# Patient Record
Sex: FEMALE | Race: WHITE
[De-identification: names, ages, dates, MRNs, and addresses within clinical notes are randomized per-mention and may not be internally consistent; named-entity substitution may affect disease eponyms.]

---

## 2019-01-07 ENCOUNTER — HOSPITAL ENCOUNTER (INPATIENT)
Dept: HOSPITAL 75 - ER | Age: 59
LOS: 4 days | Discharge: HOME | DRG: 871 | End: 2019-01-11
Attending: INTERNAL MEDICINE | Admitting: INTERNAL MEDICINE
Payer: COMMERCIAL

## 2019-01-07 VITALS — HEIGHT: 64 IN | WEIGHT: 112 LBS | BODY MASS INDEX: 19.12 KG/M2

## 2019-01-07 DIAGNOSIS — Z88.0: ICD-10-CM

## 2019-01-07 DIAGNOSIS — Z86.010: ICD-10-CM

## 2019-01-07 DIAGNOSIS — A41.9: Primary | ICD-10-CM

## 2019-01-07 DIAGNOSIS — K57.90: ICD-10-CM

## 2019-01-07 DIAGNOSIS — E78.00: ICD-10-CM

## 2019-01-07 DIAGNOSIS — G43.909: ICD-10-CM

## 2019-01-07 DIAGNOSIS — J45.41: ICD-10-CM

## 2019-01-07 DIAGNOSIS — Z88.5: ICD-10-CM

## 2019-01-07 DIAGNOSIS — Z88.8: ICD-10-CM

## 2019-01-07 DIAGNOSIS — J18.1: ICD-10-CM

## 2019-01-07 DIAGNOSIS — J44.9: ICD-10-CM

## 2019-01-07 DIAGNOSIS — F32.9: ICD-10-CM

## 2019-01-07 DIAGNOSIS — Z85.038: ICD-10-CM

## 2019-01-07 DIAGNOSIS — B00.9: ICD-10-CM

## 2019-01-07 DIAGNOSIS — K52.9: ICD-10-CM

## 2019-01-07 DIAGNOSIS — M19.91: ICD-10-CM

## 2019-01-07 DIAGNOSIS — E78.2: ICD-10-CM

## 2019-01-07 LAB
ALBUMIN SERPL-MCNC: 3.8 GM/DL (ref 3.2–4.5)
ALP SERPL-CCNC: 110 U/L (ref 40–136)
ALT SERPL-CCNC: 27 U/L (ref 0–55)
BASOPHILS # BLD AUTO: 0 10^3/UL (ref 0–0.1)
BASOPHILS NFR BLD AUTO: 0 % (ref 0–10)
BASOPHILS NFR BLD MANUAL: 0 %
BILIRUB SERPL-MCNC: 1.4 MG/DL (ref 0.1–1)
BUN/CREAT SERPL: 16
CALCIUM SERPL-MCNC: 9.4 MG/DL (ref 8.5–10.1)
CHLORIDE SERPL-SCNC: 97 MMOL/L (ref 98–107)
CO2 SERPL-SCNC: 24 MMOL/L (ref 21–32)
CREAT SERPL-MCNC: 0.8 MG/DL (ref 0.6–1.3)
EOSINOPHIL # BLD AUTO: 0 10^3/UL (ref 0–0.3)
EOSINOPHIL NFR BLD AUTO: 0 % (ref 0–10)
EOSINOPHIL NFR BLD MANUAL: 0 %
ERYTHROCYTE [DISTWIDTH] IN BLOOD BY AUTOMATED COUNT: 13 % (ref 10–14.5)
GFR SERPLBLD BASED ON 1.73 SQ M-ARVRAT: > 60 ML/MIN
GLUCOSE SERPL-MCNC: 140 MG/DL (ref 70–105)
HCT VFR BLD CALC: 37 % (ref 35–52)
HGB BLD-MCNC: 12.4 G/DL (ref 11.5–16)
LYMPHOCYTES # BLD AUTO: 1.2 X 10^3 (ref 1–4)
LYMPHOCYTES NFR BLD AUTO: 5 % (ref 12–44)
MANUAL DIFFERENTIAL PERFORMED BLD QL: YES
MCH RBC QN AUTO: 29 PG (ref 25–34)
MCHC RBC AUTO-ENTMCNC: 33 G/DL (ref 32–36)
MCV RBC AUTO: 86 FL (ref 80–99)
MONOCYTES # BLD AUTO: 1.4 X 10^3 (ref 0–1)
MONOCYTES NFR BLD AUTO: 6 % (ref 0–12)
MONOCYTES NFR BLD: 4 %
NEUTROPHILS # BLD AUTO: 19.7 X 10^3 (ref 1.8–7.8)
NEUTROPHILS NFR BLD AUTO: 88 % (ref 42–75)
NEUTS BAND NFR BLD MANUAL: 85 %
NEUTS BAND NFR BLD: 7 %
PLATELET # BLD: 294 10^3/UL (ref 130–400)
PMV BLD AUTO: 11.5 FL (ref 7.4–10.4)
POTASSIUM SERPL-SCNC: 3.5 MMOL/L (ref 3.6–5)
PROT SERPL-MCNC: 6.9 GM/DL (ref 6.4–8.2)
RBC # BLD AUTO: 4.34 10^6/UL (ref 4.35–5.85)
RBC MORPH BLD: NORMAL
SODIUM SERPL-SCNC: 137 MMOL/L (ref 135–145)
VARIANT LYMPHS NFR BLD MANUAL: 4 %
WBC # BLD AUTO: 22.4 10^3/UL (ref 4.3–11)

## 2019-01-07 PROCEDURE — 80053 COMPREHEN METABOLIC PANEL: CPT

## 2019-01-07 PROCEDURE — 85007 BL SMEAR W/DIFF WBC COUNT: CPT

## 2019-01-07 PROCEDURE — 94640 AIRWAY INHALATION TREATMENT: CPT

## 2019-01-07 PROCEDURE — 87040 BLOOD CULTURE FOR BACTERIA: CPT

## 2019-01-07 PROCEDURE — 83605 ASSAY OF LACTIC ACID: CPT

## 2019-01-07 PROCEDURE — 94760 N-INVAS EAR/PLS OXIMETRY 1: CPT

## 2019-01-07 PROCEDURE — 96375 TX/PRO/DX INJ NEW DRUG ADDON: CPT

## 2019-01-07 PROCEDURE — 87804 INFLUENZA ASSAY W/OPTIC: CPT

## 2019-01-07 PROCEDURE — 85027 COMPLETE CBC AUTOMATED: CPT

## 2019-01-07 PROCEDURE — 71046 X-RAY EXAM CHEST 2 VIEWS: CPT

## 2019-01-07 PROCEDURE — 96374 THER/PROPH/DIAG INJ IV PUSH: CPT

## 2019-01-07 PROCEDURE — 36415 COLL VENOUS BLD VENIPUNCTURE: CPT

## 2019-01-07 PROCEDURE — 85025 COMPLETE CBC W/AUTO DIFF WBC: CPT

## 2019-01-07 PROCEDURE — 94664 DEMO&/EVAL PT USE INHALER: CPT

## 2019-01-07 PROCEDURE — 93041 RHYTHM ECG TRACING: CPT

## 2019-01-07 NOTE — DIAGNOSTIC IMAGING REPORT
PATIENT HISTORY: Cough and fever. 



TECHNIQUE: 2 views of the chest



COMPARISON: CT from 06/30/2015



FINDINGS:

There are dense airspace consolidations in the right lung. Lung

volumes are large. The cardiac silhouette is normal in size.

There is no pleural effusion or pneumothorax. No acute osseous

abnormality is seen.



IMPRESSION: 

1. Dense consolidations in the right upper lobe, consistent with

pneumonia. Recommend followup radiographs to resolution, or

followup chest CT as underlying neoplasm is not excluded.



Dictated by: 



  Dictated on workstation # [id number]

## 2019-01-07 NOTE — XMS REPORT
Continuity of Care Document

 Created on: 2019



ISAI BERMUDEZ

External Reference #: [id number]

: 1960

Sex: Female



Demographics







 Address  [address] [place]  [address]

 

 Home Phone  [phone] x

 

 Preferred Language  Unknown

 

 Marital Status  Unknown

 

 Denominational Affiliation  Unknown

 

 Race  Unknown

 

 Ethnic Group  Unknown





Author







 Author  [facility]

 

 Organization  [facility]

 

 Address  Unknown

 

 Phone  Unavailable



              



Allergies

      





 Active            Description            Code            Type            
Severity            Reaction            Onset            Reported/Identified   
         Relationship to Patient            Clinical Status        

 

 Yes            AUGMENTIN                                      UNKNOWN         
   OTHER                                                           

 

 Yes            CODEINE SULFATE                                      UNKNOWN   
         OTHER                                                           

 

 Yes            LEVAQUIN                                      UNKNOWN          
  ITCHING                                                           

 

 Yes            SINGULAIR                                      UNKNOWN         
   ITCHING                                                           

 

 Yes            TOPAMAX                                      SEVERE            
OTHER                                                           

 

 Yes            No Allergy Information Available            [id number]         
   Drug Allergy            Unknown            N/A                         2015                                  

 

 Yes            acetaminophen            [id number]            Drug Allergy    
        Moderate            "KNOCKED HER OU                         2016 
                                 

 

 Yes            amoxicillin            [id number]            Drug Allergy      
      Moderate            LIPS SWELL                         2016        
                          

 

 Yes            clavulanic acid            [id number]            Drug Allergy  
          Moderate            LIPS SWELL                         2016    
                              

 

 Yes            codeine            [id number]            Drug Allergy          
  Moderate            "KNOCKED HER OU                         2016       
                           



                                    



Medications

      





 Medication            Packaging            Start Date            Stop Date    
        Route            Dosage            Sig        

 

             NORMAL SALINE 500CC IV BAG INJ 0.9 % (NS 500CC IV BAG)            
          ml            2018                       
                           ONCE&1605                  

 

             ALPRAZOLAM TAB 0.25 MG (XANAX)                      MG            
10/31/2018            10/31/2018                                               
   PRN ONCE                  



                    



Problems

      





 Date Dx Coded            Attending            Type            Code            
Diagnosis            Diagnosed By        

 

 2015                         Ot            V76.12                       
           

 

 2015            RADHA CRAWLEY, MONICA GROSS            Ot            V76.12    
                              

 

 2015            GABBY BAIG DO            Ot            153.9 
                                 

 

 2015            GABBY BAIG DO            Ot            153.9 
                                 

 

 2015            NATALIA CRAWLEY, PIPPA QUINTEROS            Ot            153.9          
                        

 

 2015            NATALIA CRAWLEY, PIPPA QUINTEROS            Ot            153.9          
                        

 

 2015            NATALIA CRAWLEY, PIPPA QUINTEROS            Ot            153.9          
  MALIGNANT JACOB COLON NOS                     

 

 10/02/2015                         Ot            V76.12                       
           

 

 2015                         Ot            V76.12                       
           

 

 2016            RADHA CRAWLEY, MONICA GROSS            Ot            Z12.31    
                              

 

 10/17/2016                         Ot            V76.12            OTH SCREEN 
MAMMO-MALIGN NEOPLASM OF FELIPE                     

 

 10/17/2016            RADHA CRAWLEY, MONICA GROSS            Ot            V76.12    
        OTH SCREEN MAMMO-MALIGN NEOPLASM OF FELIPE                     

 

 10/17/2016            JOVANNI DO, LASHAEROUTIE            Ot            153.9 
           MALIGNANT JACOB COLON NOS                     

 

 10/17/2016            JOVANNI DO, CHANDROUTIE            Ot            153.9 
           MALIGNANT JACOB COLON NOS                     

 

 10/17/2016            RADHA CRAWLEY, MONICA GROSS            Ot            Z12.31    
        ENCNTR SCREEN MAMMOGRAM FOR MALIGNANT NE                     

 

 10/19/2016                         Ot            V76.12            OTH SCREEN 
MAMMO-MALIGN NEOPLASM OF FELIPE                     

 

 10/19/2016            MONICA GARCIA MD            Ot            V76.12    
        OTH SCREEN MAMMO-MALIGN NEOPLASM OF FELIPE                     

 

 10/19/2016            JOVANNI JOHNSTON, LASHAEROUTIE            Ot            153.9 
           MALIGNANT JACOB COLON NOS                     

 

 10/19/2016            JOVANNI JOHNSTON, LASHAEROUTIE            Ot            153.9 
           MALIGNANT JACOB COLON NOS                     

 

 10/19/2016            RADHA CRAWLEY, MONICA GROSS            Ot            Z12.31    
        ENCNTR SCREEN MAMMOGRAM FOR MALIGNANT NE                     

 

 2016            ROSALES COOK MD            Ot            Z01.818       
     ENCOUNTER FOR OTHER PREPROCEDURAL EXAMIN                     

 

 2016            ROSALES COOK MD            Ot            Z12.11        
    ENCOUNTER FOR SCREENING FOR MALIGNANT NE                     

 

 2016            ROSALES COOK MD            Ot            Z85.048       
     PRSNL HX OF MALIG NEOPLM OF RECTUM, RECT                     

 

 2016            ROSALES COOK MD            Ot            K57.30        
    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                     

 

 2016            ROSALES COOK MD            Ot            K63.5         
   POLYP OF COLON                     

 

 2016            ROSALES COOK MD            Ot            K64.1         
   SECOND DEGREE HEMORRHOIDS                     

 

 2016            ROSALES COOK MD            Ot            Z12.11        
    ENCOUNTER FOR SCREENING FOR MALIGNANT NE                     

 

 2016            ROSALES COOK MD            Ot            Z85.048       
     PRSNL HX OF MALIG NEOPLM OF RECTUM, RECT                     

 

 2016            ROSALES COOK MD            Ot            K57.30        
    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                     

 

 2016            ROSALES COOK MD            Ot            K63.5         
   POLYP OF COLON                     

 

 2016            ROSALES COOK MD            Ot            K64.1         
   SECOND DEGREE HEMORRHOIDS                     

 

 2016            ROSALES COOK MD, Ot            Z12.11        
    ENCOUNTER FOR SCREENING FOR MALIGNANT NE                     

 

 2016            ROSALES COOK MD, Ot            Z85.048       
     PRSNL HX OF MALIG NEOPLM OF RECTUM, RECT                     

 

 2016            ROSALES COOK MD, Ot            K57.30        
    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                     

 

 2016            ROSALES COOK MD, Ot            K63.5         
   POLYP OF COLON                     

 

 2016            ROSALES COOK MD, Ot            K64.1         
   SECOND DEGREE HEMORRHOIDS                     

 

 2016            ROSALES COOK MD, Ot            Z12.11        
    ENCOUNTER FOR SCREENING FOR MALIGNANT NE                     

 

 2016            ROSALES COOK MD, Ot            Z85.048       
     PRSNL HX OF MALIG NEOPLM OF RECTUM, RECT                     

 

 2018            Erendira Stevens            787.0            
NAUSEA AND VOMITING                     

 

 2018            Erendira Stevens            R11.2            
NAUSEA WITH VOMITING, UNSPECIFIED                     

 

 2018            Erendira Stevens            787.0            
NAUSEA AND VOMITING                     

 

 2018            Erendira Stevens            R11.2            
NAUSEA WITH VOMITING, UNSPECIFIED                     

 

 2018            Brokob, Juliet            A            008.8             
                     

 

 2018            Brokob, Juliet            W            041.81            
MYCOPLASMA INFECTION IN CONDITIONS CLASSIFIED ELSEWHERE AND OF UNSPECIFIED SITE
                     

 

 2018            Brokob, Juliet            W            300.00            
ANXIETY STATE, UNSPECIFIED                     

 

 2018            Brokob, Juliet            W            780.60            
FEVER, UNSPECIFIED                     

 

 2018            Brokob, Juliet            W            791.9            
OTHER NONSPECIFIC FINDINGS ON EXAMINATION OF URINE                     

 

 2018            Brokob, Juliet            A            A08.4            
VIRAL INTESTINAL INFECTION, UNSPECIFIED                     

 

 2018            Brokob, Juliet            W            B96.0            
MYCOPLASMA PNEUMONIAE AS THE CAUSE OF DISEASES CLASSD ELSWHR                   
  

 

 2018            Brokob, Juliet            W            F41.9            
ANXIETY DISORDER, UNSPECIFIED                     

 

 2018            Brokob, Juliet            W            R50.9            
FEVER, UNSPECIFIED                     

 

 2018            Brokob, Juliet            W            R82.99            
OTHER ABNORMAL FINDINGS IN URINE                     

 

 2018            Kemar Villavicencio            300.00            
ANXIETY STATE, UNSPECIFIED                     

 

 2018            Kemar Villavicencio            486            
PNEUMONIA, ORGANISM UNSPECIFIED                     

 

 2018            Kemar Villavicencio            780.79            
OTHER MALAISE AND FATIGUE                     

 

 2018            Kemar Villavicencio            F41.9            
ANXIETY DISORDER, UNSPECIFIED                     

 

 2018            Kemar Villavicencio            J18.9            
PNEUMONIA, UNSPECIFIED ORGANISM                     

 

 2018            Kemar Villavicencio            R53.83            
OTHER FATIGUE                     

 

 2018            MONICA GARCIA            W            272.4            
OTHER AND UNSPECIFIED HYPERLIPIDEMIA                     

 

 2018            MONICA GARCIA            W            E78.5            
HYPERLIPIDEMIA, UNSPECIFIED                     

 

 2018            GARCIAMONICA REYES            W            V70.0            
ROUTINE GENERAL MEDICAL EXAMINATION AT A HEALTH CARE FACILITY                  
   

 

 2018            MONICA GARCIA            W            Z00.00          
  ENCOUNTER FOR GENERAL ADULT MEDICAL EXAMINATION WITHOUT ABNORMAL FINDINGS    
                 

 

 2018            MONICA GARCIA            W            272.4            
OTHER AND UNSPECIFIED HYPERLIPIDEMIA                     

 

 2018            MONICA GARCIA            W            E78.5            
HYPERLIPIDEMIA, UNSPECIFIED                     

 

 2018            GARCIAMONICA REYES            W            V70.0            
ROUTINE GENERAL MEDICAL EXAMINATION AT A HEALTH CARE FACILITY                  
   

 

 2018            MONICA GARCIA            W            Z00.00          
  ENCOUNTER FOR GENERAL ADULT MEDICAL EXAMINATION WITHOUT ABNORMAL FINDINGS    
                 

 

 2018            MONICA GARCIA            W            272.4            
OTHER AND UNSPECIFIED HYPERLIPIDEMIA                     

 

 2018            MONICA GARCIA            W            E78.5            
HYPERLIPIDEMIA, UNSPECIFIED                     

 

 2018            GARCIAMONICA REYES            W            V70.0            
ROUTINE GENERAL MEDICAL EXAMINATION AT A HEALTH CARE FACILITY                  
   

 

 2018            GARCIAMONICA REYES            W            Z00.00          
  ENCOUNTER FOR GENERAL ADULT MEDICAL EXAMINATION WITHOUT ABNORMAL FINDINGS    
                 

 

 2018            MONICA GARCIA            W            V76.10          
  BREAST SCREENING, UNSPECIFIED                     

 

 2018            MONICA GARCIA            W            Z12.39          
  ENCOUNTER FOR OTHER SCREENING FOR MALIGNANT NEOPLASM OF BREAST               
      

 

 2018            MONICA GARCIA            W            V76.10          
  BREAST SCREENING, UNSPECIFIED                     

 

 2018            MONICA GARCIA            W            Z12.39          
  ENCOUNTER FOR OTHER SCREENING FOR MALIGNANT NEOPLASM OF BREAST               
      

 

 10/31/2018            Kareem Kwan            300.00            
ANXIETY STATE, UNSPECIFIED                     

 

 10/31/2018            Kareem Kwan            491.20            
OBSTRUCTIVE CHRONIC BRONCHITIS, WITHOUT  EXACERBATION                     

 

 10/31/2018            Kareem Kwan            786.09            
OTHER DYSPNEA AND RESPIRATORY ABNORMALITY                     

 

 10/31/2018            Kareem Kwan            F41.9            
ANXIETY DISORDER, UNSPECIFIED                                                  
  

 

 10/31/2018            Kareem Kwan            J44.9            
CHRONIC OBSTRUCTIVE PULMONARY DISEASE, UNSPECIFIED                             
  

 

 10/31/2018            Kareem Kwan            R06.09            
OTHER FORMS OF DYSPNEA                     



                                                                               
                                                                               
                  



Procedures

      



There is no data.                  



Results

      





 Test            Result            Range        









 Urine Culture - 17 15:00         









 PRELIM CULTURE RESULTS            No Growth 24 hours                      

 

 FINAL CULTURE RESULTS            <10,000 Gram Positive Mixed Rashmi 
[id number] Skin Contaminant [id number] Further Workup done                      

 

 MEDIA PLATED            Setup at 15:16 on 3/24/2017                      

 

 CULTURE SOURCE            VOID                      









 Mycoplasma - 18 15:02         









 Mycoplasma            Negative             Negative        









 Thyroid Stimulating Hormone - 18 14:53         









 TSH            0.81 mIU/mL            0.32-5.00        









 Urinalysis - 18 14:53         









 Icotest            Negative             Negative        

 

 Urine Volume            Urine Volume Sufficient (10mL)                      

 

 Urine Yeast            No Yeast present                      

 

 Urine-Appearance            Clear             Clear        

 

 Urine-Bacteria            1+                      

 

 Urine-Bilirubin            1+             Negative        

 

 Urine-Blood            Trace-intact             Negative        

 

 Urine-Color            Yellow             Colorless-Lt. Yellow        

 

 Urine-Epithelial Cells            0-5/HPF                      

 

 Urine-Glucose            Negative             Negative        

 

 Urine-Ketones            1+             Negative        

 

 Urine-Leukocytes            Trace             Negative        

 

 Urine-Nitrite            Negative             Negative        

 

 Urine-Other            Culture to follow                      

 

 Urine-pH            7.0             5-8.5        

 

 Urine-Protein            Trace             Negative        

 

 Urine-RBC            0-2/HPF                      

 

 Urine-Specific Gravity            1.020             1.000-1.030        

 

 Urine-WBC            5-10/HPF                      

 

 Urobilinogen            0.2 E.U./dL             0.2-1.0        









 Urine Culture - 18 14:53         









 PRELIM CULTURE RESULTS            10,000-20,000 Gram Positive Mixed Rahsmi     
                 

 

 FINAL CULTURE RESULTS            10,000-20,000 Gram Positive Mixed Rashmi 
Probable Skin Contaminant No Further Workup done                      

 

 MEDIA PLATED            Setup at 17:45 on 2018                      

 

 CULTURE SOURCE            void                      









 D-Dimer  - 18 12:07         









 DDimer            101.00 ng/mL            21..00        









 Arterial Blood Gas - 18 14:14         









 Base            2.00 mmol/L            1.80-4.20        

 

 HCO3            24 mmol/L            20-31        

 

 O2 Sat            98 RM AIR after excercise %                    

 

 pCO2            27 mm/Hg            35-45        

 

 pH            7.57             7.35-7.45        

 

 PO2            86 mm/Hg            80-95        









 Lipid Panel - 18 13:50         









 C/HDL            2.7             3.7-6.7        

 

 Cholesterol            169 mg/dL            100-240        

 

 HDL            63 mg/dL            30-85        

 

 LDL-Calculated            72 mg/dL            0-100        

 

 Trig            169 mg/dL                    

 

 VLDL            34 mg/dL            0-42        



                              



Encounters

      





 ACCT No.            Visit Date/Time            Discharge            Status    
        Pt. Type            Provider            Facility            Loc./Unit  
          Complaint        

 

 [id number]            2016 10:54:00            2016 14:15:00    
        DIS            Outpatient            ROSALES COOK MD            [facility]            SDC            HISTORY RECTAL CANCER   
     

 

 [id number]            2016 05:39:00            2016 15:09:00    
        DIS            Outpatient            ROSALES COOK MD            [facility]            PREOP            HISTORY RECTAL CANCER 
       

 

 [id number]            2015 10:46:00            2015 23:59:59    
        CLS            Outpatient            MONICA GARCIA MD            
[facility]            RAD            SCREENING        

 

 [id number]            2015 14:12:00            2015 23:59:59    
        CLS            Preadmit            PIPPA FISHER MD            [facility]            ONC                     

 

 [id number]            2015 09:19:00            2015 00:01:00    
        DIS            Outpatient            PIPPA FISHER MD            [facility]            ONC                     

 

 [id number]            2015 08:13:00            2015 23:59:59    
        CLS            Outpatient            GABBY BAIG DO          
  [facility]            RAD            [facility] CANCER      
  

 

 [id number]            2015 08:00:00            2015 23:59:59    
        CLS            Outpatient            GABBY BAIG DO          
  [facility]            RAD            [facility]        

 

 [id number]            2013 14:54:00            2013 23:59:59    
        CLS            Outpatient            MONICA GARCIA MD            
[facility]            RAD            SCREENING        

 

 [id number]            2011 08:29:00                                   
   Document Registration                                                       
     

 

 [id number]            2010 13:22:00                                   
   Document Registration                                                       
     

 

 [id number]            10/31/2018 09:25:00            10/31/2018 10:31:00          
  DIS            Outpatient            Kareem Kwan                            
                   

 

 [id number]            2018 14:41:00            2018 23:59:00          
  DIS            Outpatient            MONICA GARCIA                         
                      

 

 [id number]            2018 13:50:00            2018 23:59:00          
  DIS            Outpatient            MONICA GARCIA                         
                      

 

 [id number]            2018 11:31:00            2018 15:00:00          
  DIS            Outpatient            [facility]                     

 

 [id number]            2018 14:11:00            2018 16:33:00          
  DIS            Outpatient            Juliet Alatorre                           
                    

 

 [id number]            2018 18:05:00            2018 23:59:00          
  DIS            Outpatient            Erendira Stevens                          
                     

 

 [id number]            2017 15:00:00            2017 23:59:00          
  DIS            Outpatient            Erendira Stevens                          
                     

 

 [id number]            2018 16:07:58                                      
Document Registration

## 2019-01-07 NOTE — XMS REPORT
Continuity of Care Document

 Created on: 2019



ISAI BERMUDEZ

External Reference #: [id number]

: 1960

Sex: Female



Demographics







 Address  [address] [place]  [address]

 

 Home Phone  [phone] x

 

 Preferred Language  Unknown

 

 Marital Status  Unknown

 

 Sikhism Affiliation  Unknown

 

 Race  Unknown

 

 Ethnic Group  Unknown





Author







 Author  [facility]

 

 Organization  [facility]

 

 Address  Unknown

 

 Phone  Unavailable



              



Allergies

      





 Active            Description            Code            Type            
Severity            Reaction            Onset            Reported/Identified   
         Relationship to Patient            Clinical Status        

 

 Yes            AUGMENTIN                                      UNKNOWN         
   OTHER                                                           

 

 Yes            CODEINE SULFATE                                      UNKNOWN   
         OTHER                                                           

 

 Yes            LEVAQUIN                                      UNKNOWN          
  ITCHING                                                           

 

 Yes            SINGULAIR                                      UNKNOWN         
   ITCHING                                                           

 

 Yes            TOPAMAX                                      SEVERE            
OTHER                                                           

 

 Yes            No Allergy Information Available            [id number]         
   Drug Allergy            Unknown            N/A                         2015                                  

 

 Yes            acetaminophen            [id number]            Drug Allergy    
        Moderate            "KNOCKED HER OU                         2016 
                                 

 

 Yes            amoxicillin            [id number]            Drug Allergy      
      Moderate            LIPS SWELL                         2016        
                          

 

 Yes            clavulanic acid            [id number]            Drug Allergy  
          Moderate            LIPS SWELL                         2016    
                              

 

 Yes            codeine            [id number]            Drug Allergy          
  Moderate            "KNOCKED HER OU                         2016       
                           



                                    



Medications

      





 Medication            Packaging            Start Date            Stop Date    
        Route            Dosage            Sig        

 

             NORMAL SALINE 500CC IV BAG INJ 0.9 % (NS 500CC IV BAG)            
          ml            2018                       
                           ONCE&1605                  

 

             ALPRAZOLAM TAB 0.25 MG (XANAX)                      MG            
10/31/2018            10/31/2018                                               
   PRN ONCE                  



                    



Problems

      





 Date Dx Coded            Attending            Type            Code            
Diagnosis            Diagnosed By        

 

 2015                         Ot            V76.12                       
           

 

 2015            RADHA CRAWLEY, MONICA GROSS            Ot            V76.12    
                              

 

 2015            GABBY BAIG DO            Ot            153.9 
                                 

 

 2015            GABBY BAIG DO            Ot            153.9 
                                 

 

 2015            NATALIA CRAWLEY, PIPPA QUINTEROS            Ot            153.9          
                        

 

 2015            NATALIA CRAWLEY, PIPPA QUINTEROS            Ot            153.9          
                        

 

 2015            NATALIA CRAWLEY, PIPPA QUINTEROS            Ot            153.9          
  MALIGNANT JACOB COLON NOS                     

 

 10/02/2015                         Ot            V76.12                       
           

 

 2015                         Ot            V76.12                       
           

 

 2016            RADHA CRAWLEY, MONICA GROSS            Ot            Z12.31    
                              

 

 10/17/2016                         Ot            V76.12            OTH SCREEN 
MAMMO-MALIGN NEOPLASM OF FELIPE                     

 

 10/17/2016            ARDHA CRAWLEY, MONICA GROSS            Ot            V76.12    
        OTH SCREEN MAMMO-MALIGN NEOPLASM OF FELIPE                     

 

 10/17/2016            JOVANNI DO, LASHAEROUTIE            Ot            153.9 
           MALIGNANT JACOB COLON NOS                     

 

 10/17/2016            JOVANNI DO, CHANDROUTIE            Ot            153.9 
           MALIGNANT JACOB COLON NOS                     

 

 10/17/2016            RADHA CRAWELY, MONICA GROSS            Ot            Z12.31    
        ENCNTR SCREEN MAMMOGRAM FOR MALIGNANT NE                     

 

 10/19/2016                         Ot            V76.12            OTH SCREEN 
MAMMO-MALIGN NEOPLASM OF FELIPE                     

 

 10/19/2016            MONICA GARICA MD            Ot            V76.12    
        OTH SCREEN MAMMO-MALIGN NEOPLASM OF FELIPE                     

 

 10/19/2016            JOVANNI JOHNSTON, LASHAEROUTIE            Ot            153.9 
           MALIGNANT JACOB COLON NOS                     

 

 10/19/2016            JOVANNI JOHNSTON, LASHAEROUTIE            Ot            153.9 
           MALIGNANT JACOB COLON NOS                     

 

 10/19/2016            RADHA CRAWLEY, MONICA GROSS            Ot            Z12.31    
        ENCNTR SCREEN MAMMOGRAM FOR MALIGNANT NE                     

 

 2016            ORSALES COOK MD            Ot            Z01.818       
     ENCOUNTER FOR OTHER PREPROCEDURAL EXAMIN                     

 

 2016            ROSALES COOK MD            Ot            Z12.11        
    ENCOUNTER FOR SCREENING FOR MALIGNANT NE                     

 

 2016            ROSALES COOK MD            Ot            Z85.048       
     PRSNL HX OF MALIG NEOPLM OF RECTUM, RECT                     

 

 2016            ROSALES COOK MD            Ot            K57.30        
    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                     

 

 2016            ROSALES COOK MD            Ot            K63.5         
   POLYP OF COLON                     

 

 2016            ROSALES COOK MD            Ot            K64.1         
   SECOND DEGREE HEMORRHOIDS                     

 

 2016            ROSALES COOK MD            Ot            Z12.11        
    ENCOUNTER FOR SCREENING FOR MALIGNANT NE                     

 

 2016            ROSALES COOK MD            Ot            Z85.048       
     PRSNL HX OF MALIG NEOPLM OF RECTUM, RECT                     

 

 2016            ROSALES COOK MD            Ot            K57.30        
    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                     

 

 2016            ROSALES COOK MD            Ot            K63.5         
   POLYP OF COLON                     

 

 2016            ROSALES COOK MD            Ot            K64.1         
   SECOND DEGREE HEMORRHOIDS                     

 

 2016            ROSALES COOK MD, Ot            Z12.11        
    ENCOUNTER FOR SCREENING FOR MALIGNANT NE                     

 

 2016            ROSALES COOK MD, Ot            Z85.048       
     PRSNL HX OF MALIG NEOPLM OF RECTUM, RECT                     

 

 2016            ROSALES COOK MD, Ot            K57.30        
    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                     

 

 2016            ROSALES COOK MD, Ot            K63.5         
   POLYP OF COLON                     

 

 2016            ROSALES COOK MD, Ot            K64.1         
   SECOND DEGREE HEMORRHOIDS                     

 

 2016            ROSALES COOK MD, Ot            Z12.11        
    ENCOUNTER FOR SCREENING FOR MALIGNANT NE                     

 

 2016            ROSALES COOK MD, Ot            Z85.048       
     PRSNL HX OF MALIG NEOPLM OF RECTUM, RECT                     

 

 2018            Erendira Stevens            787.0            
NAUSEA AND VOMITING                     

 

 2018            Erendira Stevens            R11.2            
NAUSEA WITH VOMITING, UNSPECIFIED                     

 

 2018            Erendira Stevens            787.0            
NAUSEA AND VOMITING                     

 

 2018            Erendira Stevens            R11.2            
NAUSEA WITH VOMITING, UNSPECIFIED                     

 

 2018            Brokob, Juliet            A            008.8             
                     

 

 2018            Brokob, Juliet            W            041.81            
MYCOPLASMA INFECTION IN CONDITIONS CLASSIFIED ELSEWHERE AND OF UNSPECIFIED SITE
                     

 

 2018            Brokob, Juliet            W            300.00            
ANXIETY STATE, UNSPECIFIED                     

 

 2018            Brokob, Juliet            W            780.60            
FEVER, UNSPECIFIED                     

 

 2018            Brokob, Juliet            W            791.9            
OTHER NONSPECIFIC FINDINGS ON EXAMINATION OF URINE                     

 

 2018            Brokob, Juliet            A            A08.4            
VIRAL INTESTINAL INFECTION, UNSPECIFIED                     

 

 2018            Brokob, Juliet            W            B96.0            
MYCOPLASMA PNEUMONIAE AS THE CAUSE OF DISEASES CLASSD ELSWHR                   
  

 

 2018            Brokob, Juliet            W            F41.9            
ANXIETY DISORDER, UNSPECIFIED                     

 

 2018            Brokob, Juliet            W            R50.9            
FEVER, UNSPECIFIED                     

 

 2018            Brokob, Juliet            W            R82.99            
OTHER ABNORMAL FINDINGS IN URINE                     

 

 2018            Kemar Villavicencio            300.00            
ANXIETY STATE, UNSPECIFIED                     

 

 2018            Kemar Villavicencio            486            
PNEUMONIA, ORGANISM UNSPECIFIED                     

 

 2018            Kemar Villavicencio            780.79            
OTHER MALAISE AND FATIGUE                     

 

 2018            Kemar Villavicencio            F41.9            
ANXIETY DISORDER, UNSPECIFIED                     

 

 2018            Kemar Villavicencio            J18.9            
PNEUMONIA, UNSPECIFIED ORGANISM                     

 

 2018            Kemar Villavicencio            R53.83            
OTHER FATIGUE                     

 

 2018            MONICA GARCIA            W            272.4            
OTHER AND UNSPECIFIED HYPERLIPIDEMIA                     

 

 2018            MONICA GARCIA            W            E78.5            
HYPERLIPIDEMIA, UNSPECIFIED                     

 

 2018            GARCIAMONICA REYES            W            V70.0            
ROUTINE GENERAL MEDICAL EXAMINATION AT A HEALTH CARE FACILITY                  
   

 

 2018            MONICA GARCIA            W            Z00.00          
  ENCOUNTER FOR GENERAL ADULT MEDICAL EXAMINATION WITHOUT ABNORMAL FINDINGS    
                 

 

 2018            MONICA GARCIA            W            272.4            
OTHER AND UNSPECIFIED HYPERLIPIDEMIA                     

 

 2018            MONICA GARCIA            W            E78.5            
HYPERLIPIDEMIA, UNSPECIFIED                     

 

 2018            GARCIAMONICA REYES            W            V70.0            
ROUTINE GENERAL MEDICAL EXAMINATION AT A HEALTH CARE FACILITY                  
   

 

 2018            MONICA GARCIA            W            Z00.00          
  ENCOUNTER FOR GENERAL ADULT MEDICAL EXAMINATION WITHOUT ABNORMAL FINDINGS    
                 

 

 2018            MONICA GARCIA            W            272.4            
OTHER AND UNSPECIFIED HYPERLIPIDEMIA                     

 

 2018            MONICA GARCIA            W            E78.5            
HYPERLIPIDEMIA, UNSPECIFIED                     

 

 2018            GARCIAMONICA REYES            W            V70.0            
ROUTINE GENERAL MEDICAL EXAMINATION AT A HEALTH CARE FACILITY                  
   

 

 2018            GARCIAMONICA REYES            W            Z00.00          
  ENCOUNTER FOR GENERAL ADULT MEDICAL EXAMINATION WITHOUT ABNORMAL FINDINGS    
                 

 

 2018            MONICA GARCIA            W            V76.10          
  BREAST SCREENING, UNSPECIFIED                     

 

 2018            MONICA GARCIA            W            Z12.39          
  ENCOUNTER FOR OTHER SCREENING FOR MALIGNANT NEOPLASM OF BREAST               
      

 

 2018            MONICA GARCIA            W            V76.10          
  BREAST SCREENING, UNSPECIFIED                     

 

 2018            MONICA GARCIA            W            Z12.39          
  ENCOUNTER FOR OTHER SCREENING FOR MALIGNANT NEOPLASM OF BREAST               
      

 

 10/31/2018            Kareem Kwan            300.00            
ANXIETY STATE, UNSPECIFIED                     

 

 10/31/2018            Kareem Kwan            491.20            
OBSTRUCTIVE CHRONIC BRONCHITIS, WITHOUT  EXACERBATION                     

 

 10/31/2018            Kareem Kwan            786.09            
OTHER DYSPNEA AND RESPIRATORY ABNORMALITY                     

 

 10/31/2018            Kareem Kwan            F41.9            
ANXIETY DISORDER, UNSPECIFIED                                                  
  

 

 10/31/2018            Kareem Kwan            J44.9            
CHRONIC OBSTRUCTIVE PULMONARY DISEASE, UNSPECIFIED                             
  

 

 10/31/2018            Kareem Kwan            R06.09            
OTHER FORMS OF DYSPNEA                     



                                                                               
                                                                               
                  



Procedures

      



There is no data.                  



Results

      





 Test            Result            Range        









 Urine Culture - 17 15:00         









 PRELIM CULTURE RESULTS            No Growth 24 hours                      

 

 FINAL CULTURE RESULTS            <10,000 Gram Positive Mixed Rashmi 
[id number] Skin Contaminant [id number] Further Workup done                      

 

 MEDIA PLATED            Setup at 15:16 on 3/24/2017                      

 

 CULTURE SOURCE            VOID                      









 Mycoplasma - 18 15:02         









 Mycoplasma            Negative             Negative        









 Thyroid Stimulating Hormone - 18 14:53         









 TSH            0.81 mIU/mL            0.32-5.00        









 Urinalysis - 18 14:53         









 Icotest            Negative             Negative        

 

 Urine Volume            Urine Volume Sufficient (10mL)                      

 

 Urine Yeast            No Yeast present                      

 

 Urine-Appearance            Clear             Clear        

 

 Urine-Bacteria            1+                      

 

 Urine-Bilirubin            1+             Negative        

 

 Urine-Blood            Trace-intact             Negative        

 

 Urine-Color            Yellow             Colorless-Lt. Yellow        

 

 Urine-Epithelial Cells            0-5/HPF                      

 

 Urine-Glucose            Negative             Negative        

 

 Urine-Ketones            1+             Negative        

 

 Urine-Leukocytes            Trace             Negative        

 

 Urine-Nitrite            Negative             Negative        

 

 Urine-Other            Culture to follow                      

 

 Urine-pH            7.0             5-8.5        

 

 Urine-Protein            Trace             Negative        

 

 Urine-RBC            0-2/HPF                      

 

 Urine-Specific Gravity            1.020             1.000-1.030        

 

 Urine-WBC            5-10/HPF                      

 

 Urobilinogen            0.2 E.U./dL             0.2-1.0        









 Urine Culture - 18 14:53         









 PRELIM CULTURE RESULTS            10,000-20,000 Gram Positive Mixed Rashmi     
                 

 

 FINAL CULTURE RESULTS            10,000-20,000 Gram Positive Mixed Rashmi 
Probable Skin Contaminant No Further Workup done                      

 

 MEDIA PLATED            Setup at 17:45 on 2018                      

 

 CULTURE SOURCE            void                      









 D-Dimer  - 18 12:07         









 DDimer            101.00 ng/mL            21..00        









 Arterial Blood Gas - 18 14:14         









 Base            2.00 mmol/L            1.80-4.20        

 

 HCO3            24 mmol/L            20-31        

 

 O2 Sat            98 RM AIR after excercise %                    

 

 pCO2            27 mm/Hg            35-45        

 

 pH            7.57             7.35-7.45        

 

 PO2            86 mm/Hg            80-95        









 Lipid Panel - 18 13:50         









 C/HDL            2.7             3.7-6.7        

 

 Cholesterol            169 mg/dL            100-240        

 

 HDL            63 mg/dL            30-85        

 

 LDL-Calculated            72 mg/dL            0-100        

 

 Trig            169 mg/dL                    

 

 VLDL            34 mg/dL            0-42        



                              



Encounters

      





 ACCT No.            Visit Date/Time            Discharge            Status    
        Pt. Type            Provider            Facility            Loc./Unit  
          Complaint        

 

 [id number]            2016 10:54:00            2016 14:15:00    
        DIS            Outpatient            ROSALES COOK MD            [facility]            SDC            HISTORY RECTAL CANCER   
     

 

 [id number]            2016 05:39:00            2016 15:09:00    
        DIS            Outpatient            ROSALES COOK MD            [facility]            PREOP            HISTORY RECTAL CANCER 
       

 

 [id number]            2015 10:46:00            2015 23:59:59    
        CLS            Outpatient            MONICA GARCIA MD            
[facility]            RAD            SCREENING        

 

 [id number]            2015 14:12:00            2015 23:59:59    
        CLS            Preadmit            PIPPA FISHER MD            [facility]            ONC                     

 

 [id number]            2015 09:19:00            2015 00:01:00    
        DIS            Outpatient            PIPPA FISHER MD            [facility]            ONC                     

 

 [id number]            2015 08:13:00            2015 23:59:59    
        CLS            Outpatient            GABBY BAIG DO          
  [facility]            RAD            COLON CANCER      
  

 

 [id number]            2015 08:00:00            2015 23:59:59    
        CLS            Outpatient            GABBY BAIG DO          
  [facility]            RAD            COLON CA        

 

 [id number]            2013 14:54:00            2013 23:59:59    
        CLS            Outpatient            MONICA GARCIA MD            
[facility]            RAD            SCREENING        

 

 [id number]            2011 08:29:00                                   
   Document Registration                                                       
     

 

 [id number]            2010 13:22:00                                   
   Document Registration                                                       
     

 

 [id number]            10/31/2018 09:25:00            10/31/2018 10:31:00          
  DIS            Outpatient            Kareem Kwan                            
                   

 

 [id number]            2018 14:41:00            2018 23:59:00          
  DIS            Outpatient            MNOICA GARCIA                         
                      

 

 [id number]            2018 13:50:00            2018 23:59:00          
  DIS            Outpatient            MONICA GARCIA                         
                      

 

 [id number]            2018 11:31:00            2018 15:00:00          
  DIS            [facility]                     

 

 [id number]            2018 14:11:00            2018 16:33:00          
  DIS            Outpatient            Juliet Alatorre                           
                    

 

 [id number]            2018 18:05:00            2018 23:59:00          
  DIS            Outpatient            Erendira Stevens                          
                     

 

 [id number]            2017 15:00:00            2017 23:59:00          
  DIS            Outpatient            Erendira Stevens                          
                     

 

 [id number]            2018 16:07:58                                      
Document Registration

## 2019-01-08 VITALS — DIASTOLIC BLOOD PRESSURE: 55 MMHG | SYSTOLIC BLOOD PRESSURE: 110 MMHG

## 2019-01-08 VITALS — SYSTOLIC BLOOD PRESSURE: 107 MMHG | DIASTOLIC BLOOD PRESSURE: 68 MMHG

## 2019-01-08 VITALS — DIASTOLIC BLOOD PRESSURE: 53 MMHG | SYSTOLIC BLOOD PRESSURE: 102 MMHG

## 2019-01-08 VITALS — SYSTOLIC BLOOD PRESSURE: 107 MMHG | DIASTOLIC BLOOD PRESSURE: 58 MMHG

## 2019-01-08 VITALS — DIASTOLIC BLOOD PRESSURE: 68 MMHG | SYSTOLIC BLOOD PRESSURE: 112 MMHG

## 2019-01-08 VITALS — DIASTOLIC BLOOD PRESSURE: 58 MMHG | SYSTOLIC BLOOD PRESSURE: 107 MMHG

## 2019-01-08 VITALS — SYSTOLIC BLOOD PRESSURE: 139 MMHG | DIASTOLIC BLOOD PRESSURE: 72 MMHG

## 2019-01-08 VITALS — DIASTOLIC BLOOD PRESSURE: 64 MMHG | SYSTOLIC BLOOD PRESSURE: 115 MMHG

## 2019-01-08 VITALS — DIASTOLIC BLOOD PRESSURE: 71 MMHG | SYSTOLIC BLOOD PRESSURE: 122 MMHG

## 2019-01-08 LAB
ALBUMIN SERPL-MCNC: 4 GM/DL (ref 3.2–4.5)
ALP SERPL-CCNC: 121 U/L (ref 40–136)
ALT SERPL-CCNC: 29 U/L (ref 0–55)
BASOPHILS # BLD AUTO: 0 10^3/UL (ref 0–0.1)
BASOPHILS NFR BLD AUTO: 0 % (ref 0–10)
BILIRUB SERPL-MCNC: 0.8 MG/DL (ref 0.1–1)
BUN/CREAT SERPL: 21
CALCIUM SERPL-MCNC: 10 MG/DL (ref 8.5–10.1)
CHLORIDE SERPL-SCNC: 103 MMOL/L (ref 98–107)
CO2 SERPL-SCNC: 26 MMOL/L (ref 21–32)
CREAT SERPL-MCNC: 0.76 MG/DL (ref 0.6–1.3)
EOSINOPHIL # BLD AUTO: 0 10^3/UL (ref 0–0.3)
EOSINOPHIL NFR BLD AUTO: 0 % (ref 0–10)
ERYTHROCYTE [DISTWIDTH] IN BLOOD BY AUTOMATED COUNT: 13.3 % (ref 10–14.5)
GFR SERPLBLD BASED ON 1.73 SQ M-ARVRAT: > 60 ML/MIN
GLUCOSE SERPL-MCNC: 165 MG/DL (ref 70–105)
HCT VFR BLD CALC: 41 % (ref 35–52)
HGB BLD-MCNC: 13.6 G/DL (ref 11.5–16)
LYMPHOCYTES # BLD AUTO: 0.6 X 10^3 (ref 1–4)
LYMPHOCYTES NFR BLD AUTO: 3 % (ref 12–44)
MANUAL DIFFERENTIAL PERFORMED BLD QL: NO
MCH RBC QN AUTO: 29 PG (ref 25–34)
MCHC RBC AUTO-ENTMCNC: 34 G/DL (ref 32–36)
MCV RBC AUTO: 86 FL (ref 80–99)
MONOCYTES # BLD AUTO: 0.4 X 10^3 (ref 0–1)
MONOCYTES NFR BLD AUTO: 2 % (ref 0–12)
NEUTROPHILS # BLD AUTO: 18 X 10^3 (ref 1.8–7.8)
NEUTROPHILS NFR BLD AUTO: 95 % (ref 42–75)
PLATELET # BLD: 254 10^3/UL (ref 130–400)
PMV BLD AUTO: 11.6 FL (ref 7.4–10.4)
POTASSIUM SERPL-SCNC: 4 MMOL/L (ref 3.6–5)
PROT SERPL-MCNC: 7.2 GM/DL (ref 6.4–8.2)
RBC # BLD AUTO: 4.75 10^6/UL (ref 4.35–5.85)
SODIUM SERPL-SCNC: 142 MMOL/L (ref 135–145)
WBC # BLD AUTO: 18.9 10^3/UL (ref 4.3–11)

## 2019-01-08 RX ADMIN — Medication SCH ML: at 06:00

## 2019-01-08 RX ADMIN — IPRATROPIUM BROMIDE AND ALBUTEROL SULFATE SCH ML: .5; 3 SOLUTION RESPIRATORY (INHALATION) at 18:55

## 2019-01-08 RX ADMIN — SODIUM CHLORIDE AND POTASSIUM CHLORIDE SCH MLS/HR: 4.5; 1.49 INJECTION, SOLUTION INTRAVENOUS at 03:15

## 2019-01-08 RX ADMIN — SODIUM CHLORIDE AND POTASSIUM CHLORIDE SCH MLS/HR: 4.5; 1.49 INJECTION, SOLUTION INTRAVENOUS at 15:30

## 2019-01-08 RX ADMIN — Medication SCH ML: at 21:23

## 2019-01-08 RX ADMIN — SODIUM CHLORIDE AND POTASSIUM CHLORIDE SCH MLS/HR: 4.5; 1.49 INJECTION, SOLUTION INTRAVENOUS at 21:34

## 2019-01-08 RX ADMIN — METHYLPREDNISOLONE SODIUM SUCCINATE SCH MG: 125 INJECTION, POWDER, FOR SOLUTION INTRAMUSCULAR; INTRAVENOUS at 04:44

## 2019-01-08 RX ADMIN — Medication SCH ML: at 15:27

## 2019-01-08 RX ADMIN — METHYLPREDNISOLONE SODIUM SUCCINATE SCH MG: 125 INJECTION, POWDER, FOR SOLUTION INTRAMUSCULAR; INTRAVENOUS at 10:14

## 2019-01-08 RX ADMIN — IPRATROPIUM BROMIDE AND ALBUTEROL SULFATE SCH ML: .5; 3 SOLUTION RESPIRATORY (INHALATION) at 14:56

## 2019-01-08 RX ADMIN — IPRATROPIUM BROMIDE AND ALBUTEROL SULFATE SCH ML: .5; 3 SOLUTION RESPIRATORY (INHALATION) at 22:04

## 2019-01-08 RX ADMIN — SODIUM CHLORIDE SCH MLS/HR: 900 INJECTION INTRAVENOUS at 20:21

## 2019-01-08 RX ADMIN — SERTRALINE HYDROCHLORIDE SCH MG: 100 TABLET ORAL at 21:27

## 2019-01-08 RX ADMIN — IPRATROPIUM BROMIDE AND ALBUTEROL SULFATE SCH ML: .5; 3 SOLUTION RESPIRATORY (INHALATION) at 07:14

## 2019-01-08 RX ADMIN — METHYLPREDNISOLONE SODIUM SUCCINATE SCH MG: 125 INJECTION, POWDER, FOR SOLUTION INTRAMUSCULAR; INTRAVENOUS at 15:31

## 2019-01-08 RX ADMIN — SODIUM CHLORIDE AND POTASSIUM CHLORIDE SCH MLS/HR: 4.5; 1.49 INJECTION, SOLUTION INTRAVENOUS at 08:48

## 2019-01-08 RX ADMIN — SIMVASTATIN SCH MG: 20 TABLET, FILM COATED ORAL at 21:27

## 2019-01-08 RX ADMIN — IPRATROPIUM BROMIDE AND ALBUTEROL SULFATE SCH ML: .5; 3 SOLUTION RESPIRATORY (INHALATION) at 10:54

## 2019-01-08 NOTE — NUR
Dr. Lang notified requesting to continue home medications of zoloft and zocor.  New orders 
rec to continue these two meds per med rec list.  See emar/med rec list.

## 2019-01-08 NOTE — NUR
PATIENT HAD A LIST OF HER MEDICATIONS, SHE VERIFIED HOW SHE TAKES THEM. I COMPARED WITH THE 
EXT MED HX.

## 2019-01-08 NOTE — HISTORY & PHYSICAL-HOSPITALIST
History of Present Illness


HPI/Chief Complaint


The patient is a 58-year-old white female who reports that she had an episode 

of what she thought to be the flu at about .  She got over that and 

did well enough until about  when this occurred again.  She works at 

[employer] and was then scheduled to work several days in row last 

week.  Unable to work Thursday and Friday of last week and return to her 

Saturday and .   evening she began to feel quite fatigued and short 

of breath but was able to finish her shift.  That night she began to have 

rather sharp and unrelenting pain in her right chest.  She was to have picked 

up a grandchild to take them to school on Monday did not feel that she can 

perform this but ultimately did so.  She continued to feel worse and her 

 called an off spring who took her to the clinic to be seen.  It was 

concluded that she was too ill and she was sent on to the emergency room.  The 

emergency room showed her to have a considerable consolidation in the right mid 

lung field.  Her temperature was 101.6 and her white blood count was 22,000.


Date Seen


19


Time Seen by a Provider:  14:57


Attending Physician


Terence Randall MD


PCP


Marilynn Dean MD


Referring Physician





Date of Admission


2019 at 21:50





Home Medications & Allergies


Home Medications


Reviewed patient Home Medication Reconciliation performed by pharmacy 

medication reconciliations technician and/or nursing.


Patients Allergies have been reviewed.





Allergies





Allergies


Coded Allergies


  amoxicillin (Verified Allergy, Intermediate, LIPS SWELL, 16)


  clavulanic acid (Verified Allergy, Intermediate, LIPS SWELL, 16)


  codeine (Verified Allergy, Intermediate, "KNOCKED HER OUT", 16)








Past Medical-Social-Family Hx


Past Med/Social Hx:  Reviewed Nursing Past Med/Soc Hx


Patient Social History


Alcohol Use:  Denies Use


Recreational Drug Use:  No


Smoking Status:  Never a Smoker


2nd Hand Smoke Exposure:  No


Physical Abuse Screen:  No


Sexual Abuse:  No


Recent Foreign Travel:  No


Contact w/other who traveled:  No


Recent Hopitalizations:  No


Recent Infectious Disease Expo:  No





Immunizations Up To Date


Pediatric:  Yes





Seasonal Allergies


Seasonal Allergies:  Yes





Past Medical History


Cardiac:  High Cholesterol


Neurological:  Headaches /Migraines


Pregnant:  No


Reproductive:  No


Sexually Transmitted Disease:  No


HIV/AIDS:  No


Female Reproductive Disorders:  Denies


Menopausal


Gastrointestinal:  Diverticulosis, Chronic Diarrhea, Polyps


Musculoskeletal:  Arthritis


Loss of Vision:  Denies


Hearing Impairment:  Denies


Cancer:  Colon


History of Blood Disorders:  No


Adverse Reaction to Blood Polanco:  No (N/A)





Family History





Cardiovascular disease


  19 FATHER


Diabetes mellitus


  19 MOTHER


Myocardial infarction


  19 MOTHER





Review of Systems


Constitutional:  see HPI


EENTM:  no symptoms reported


Respiratory:  see HPI, cough, short of breath, other (pleuritic chest pain)


Cardiovascular:  no symptoms reported


Gastrointestinal:  no symptoms reported


Genitourinary:  no symptoms reported


Musculoskeletal:  muscle pain


Skin:  no symptoms reported


Psychiatric/Neurological:  No Symptoms Reported





Physical Exam


Physical Exam


Vital Signs





Vital Signs - First Documented








 19





 19:21 21:36


 


Temp 101.6 


 


Pulse 121 


 


Resp 22 


 


B/P (MAP) 123/57 (79) 


 


Pulse Ox 98 


 


O2 Delivery Nasal Cannula 


 


O2 Flow Rate  2.00





Capillary Refill : Less Than 3 Seconds


Height, Weight, BMI


Height: 5'4.00"


Weight: 112lbs. 0.0oz. [id number]; 19.2 BMI


Method:Stated


General Appearance:  Mild Distress, Moderate Distress


Eyes:  Bilateral Eye Normal Inspection


HEENT:  Normal ENT Inspection


Neck:  Full Range of Motion, Normal Inspection, Non Tender, Supple, Carotid 

Bruit


Respiratory:  Decreased Breath Sounds (breath sounds are distant.  There is no 

wheezing or rhonchi)


Cardiovascular:  Regular Rate, Rhythm


Gastrointestinal:  Normal Bowel Sounds, No Organomegaly, No Pulsatile Mass


Back:  Normal Inspection


Extremity:  Normal Capillary Refill, Normal Inspection, Normal Range of Motion, 

Non Tender, No Calf Tenderness, No Pedal Edema


Skin:  Normal Color, Warm/Dry


Lymphatic:  No Adenopathy





Results


Results/Procedures


Labs


Laboratory Tests


19 19:35








19 04:10








Patient resulted labs reviewed.





Assessment/Plan


Admission Diagnosis


Sepsis/right middle lobe pneumonia


Admission Status:  Inpatient Order (span 2 midnights)


Reason for Inpatient Admission:  


We will need at least 3 days of IV antibiotics





Clinical Quality Measures


DVT/VTE Risk/Contraindication:


Risk Factor Score Per Nursin


RFS Level Per Nursing on Admit:  4+=Very High











TERENCE RANDALL MD 2019 14:56

## 2019-01-08 NOTE — NUR
ISAI BERMUDEZ admitted to room 429-1, with an admitting diagnosis of PNEUMONIA, SEPSIS , on 
01/08/19 from ER via WHEELCHAIR, accompanied by HOSPITAL STAFF. ISAI BERMUDEZ introduced to 
surroundings, call light, bed controls, phone, TV, temperature control, lights, meal times, 
smoking policy, visitor policy, side rail policy, bathrooms and showers.  Patient Rights 
given to patient in the handbook.ISAI BERMUDEZ verbalizes understanding that [facility] is 
not responsible for the loss or damage to any personal effects or valuables that are kept in 
the patients posession during their hospitalization. ISAI BERMUDEZ verbalizes understanding 
of Interdisciplinary Patient Education. Patient and/or family were informed about the Rapid 
Response Team and its purpose.

## 2019-01-09 VITALS — SYSTOLIC BLOOD PRESSURE: 137 MMHG | DIASTOLIC BLOOD PRESSURE: 68 MMHG

## 2019-01-09 VITALS — SYSTOLIC BLOOD PRESSURE: 117 MMHG | DIASTOLIC BLOOD PRESSURE: 67 MMHG

## 2019-01-09 VITALS — DIASTOLIC BLOOD PRESSURE: 58 MMHG | SYSTOLIC BLOOD PRESSURE: 122 MMHG

## 2019-01-09 VITALS — DIASTOLIC BLOOD PRESSURE: 65 MMHG | SYSTOLIC BLOOD PRESSURE: 129 MMHG

## 2019-01-09 VITALS — SYSTOLIC BLOOD PRESSURE: 125 MMHG | DIASTOLIC BLOOD PRESSURE: 64 MMHG

## 2019-01-09 RX ADMIN — IPRATROPIUM BROMIDE AND ALBUTEROL SULFATE SCH ML: .5; 3 SOLUTION RESPIRATORY (INHALATION) at 11:03

## 2019-01-09 RX ADMIN — Medication SCH ML: at 04:42

## 2019-01-09 RX ADMIN — SODIUM CHLORIDE SCH MLS/HR: 900 INJECTION INTRAVENOUS at 20:42

## 2019-01-09 RX ADMIN — IPRATROPIUM BROMIDE AND ALBUTEROL SULFATE SCH ML: .5; 3 SOLUTION RESPIRATORY (INHALATION) at 14:42

## 2019-01-09 RX ADMIN — IPRATROPIUM BROMIDE AND ALBUTEROL SULFATE SCH ML: .5; 3 SOLUTION RESPIRATORY (INHALATION) at 18:55

## 2019-01-09 RX ADMIN — AZITHROMYCIN SCH MG: 250 TABLET, FILM COATED ORAL at 09:28

## 2019-01-09 RX ADMIN — SODIUM CHLORIDE AND POTASSIUM CHLORIDE SCH MLS/HR: 4.5; 1.49 INJECTION, SOLUTION INTRAVENOUS at 11:28

## 2019-01-09 RX ADMIN — Medication SCH ML: at 20:42

## 2019-01-09 RX ADMIN — SERTRALINE HYDROCHLORIDE SCH MG: 100 TABLET ORAL at 20:42

## 2019-01-09 RX ADMIN — IPRATROPIUM BROMIDE AND ALBUTEROL SULFATE SCH ML: .5; 3 SOLUTION RESPIRATORY (INHALATION) at 02:31

## 2019-01-09 RX ADMIN — SIMVASTATIN SCH MG: 20 TABLET, FILM COATED ORAL at 20:43

## 2019-01-09 RX ADMIN — SODIUM CHLORIDE AND POTASSIUM CHLORIDE SCH MLS/HR: 4.5; 1.49 INJECTION, SOLUTION INTRAVENOUS at 04:46

## 2019-01-09 RX ADMIN — IPRATROPIUM BROMIDE AND ALBUTEROL SULFATE SCH ML: .5; 3 SOLUTION RESPIRATORY (INHALATION) at 07:30

## 2019-01-09 RX ADMIN — IPRATROPIUM BROMIDE AND ALBUTEROL SULFATE SCH ML: .5; 3 SOLUTION RESPIRATORY (INHALATION) at 22:53

## 2019-01-09 RX ADMIN — Medication SCH ML: at 14:21

## 2019-01-09 NOTE — PROGRESS NOTE-HOSPITALIST
Progress Note


Progress Notes/Assess & Plan


Date Seen


1/9/19


Time Seen by Provider:  14:43


Assessment & Plan


The patient reports that she feels much better today.  She is afebrile.  The 

white count has fallen from 22. 4 to 18.9 vital signs are stable and she does 

not require oxygen.  She was able to give herself a shower today.





Physical exam: Color is good and she is lively.  Lungs are clear to 

auscultation.  CV is regular without murmur or tachycardia.  Extremities show 

no pedal edema.





Impression: Right middle lobe pneumonia.





Plan: Continue IV antibiotics.  DC IV fluids.  Mobilize.





Focused Exam


Lactate Level


1/7/19 19:35: Lactic Acid Level 0.95











VIRGIL RANDALL MD Jan 9, 2019 14:47

## 2019-01-10 VITALS — SYSTOLIC BLOOD PRESSURE: 138 MMHG | DIASTOLIC BLOOD PRESSURE: 77 MMHG

## 2019-01-10 VITALS — DIASTOLIC BLOOD PRESSURE: 68 MMHG | SYSTOLIC BLOOD PRESSURE: 125 MMHG

## 2019-01-10 VITALS — SYSTOLIC BLOOD PRESSURE: 125 MMHG | DIASTOLIC BLOOD PRESSURE: 69 MMHG

## 2019-01-10 VITALS — DIASTOLIC BLOOD PRESSURE: 76 MMHG | SYSTOLIC BLOOD PRESSURE: 144 MMHG

## 2019-01-10 LAB
ALBUMIN SERPL-MCNC: 3.4 GM/DL (ref 3.2–4.5)
ALP SERPL-CCNC: 84 U/L (ref 40–136)
ALT SERPL-CCNC: 29 U/L (ref 0–55)
BASOPHILS # BLD AUTO: 0 10^3/UL (ref 0–0.1)
BASOPHILS NFR BLD AUTO: 0 % (ref 0–10)
BILIRUB SERPL-MCNC: 0.3 MG/DL (ref 0.1–1)
BUN/CREAT SERPL: 24
CALCIUM SERPL-MCNC: 8.9 MG/DL (ref 8.5–10.1)
CHLORIDE SERPL-SCNC: 106 MMOL/L (ref 98–107)
CO2 SERPL-SCNC: 24 MMOL/L (ref 21–32)
CREAT SERPL-MCNC: 0.72 MG/DL (ref 0.6–1.3)
EOSINOPHIL # BLD AUTO: 0 10^3/UL (ref 0–0.3)
EOSINOPHIL NFR BLD AUTO: 0 % (ref 0–10)
ERYTHROCYTE [DISTWIDTH] IN BLOOD BY AUTOMATED COUNT: 13.8 % (ref 10–14.5)
GFR SERPLBLD BASED ON 1.73 SQ M-ARVRAT: > 60 ML/MIN
GLUCOSE SERPL-MCNC: 145 MG/DL (ref 70–105)
HCT VFR BLD CALC: 33 % (ref 35–52)
HGB BLD-MCNC: 10.8 G/DL (ref 11.5–16)
LYMPHOCYTES # BLD AUTO: 1.1 X 10^3 (ref 1–4)
LYMPHOCYTES NFR BLD AUTO: 8 % (ref 12–44)
MANUAL DIFFERENTIAL PERFORMED BLD QL: NO
MCH RBC QN AUTO: 29 PG (ref 25–34)
MCHC RBC AUTO-ENTMCNC: 33 G/DL (ref 32–36)
MCV RBC AUTO: 87 FL (ref 80–99)
MONOCYTES # BLD AUTO: 0.8 X 10^3 (ref 0–1)
MONOCYTES NFR BLD AUTO: 6 % (ref 0–12)
NEUTROPHILS # BLD AUTO: 11.3 X 10^3 (ref 1.8–7.8)
NEUTROPHILS NFR BLD AUTO: 86 % (ref 42–75)
PLATELET # BLD: 304 10^3/UL (ref 130–400)
PMV BLD AUTO: 11.1 FL (ref 7.4–10.4)
POTASSIUM SERPL-SCNC: 4 MMOL/L (ref 3.6–5)
PROT SERPL-MCNC: 6.1 GM/DL (ref 6.4–8.2)
RBC # BLD AUTO: 3.74 10^6/UL (ref 4.35–5.85)
SODIUM SERPL-SCNC: 142 MMOL/L (ref 135–145)
WBC # BLD AUTO: 13.2 10^3/UL (ref 4.3–11)

## 2019-01-10 RX ADMIN — IPRATROPIUM BROMIDE AND ALBUTEROL SULFATE SCH ML: .5; 3 SOLUTION RESPIRATORY (INHALATION) at 23:14

## 2019-01-10 RX ADMIN — IPRATROPIUM BROMIDE AND ALBUTEROL SULFATE SCH ML: .5; 3 SOLUTION RESPIRATORY (INHALATION) at 12:12

## 2019-01-10 RX ADMIN — IPRATROPIUM BROMIDE AND ALBUTEROL SULFATE SCH ML: .5; 3 SOLUTION RESPIRATORY (INHALATION) at 19:54

## 2019-01-10 RX ADMIN — DOCOSANOL 10 % TOPICAL CREAM SCH GM: at 19:55

## 2019-01-10 RX ADMIN — Medication SCH ML: at 18:34

## 2019-01-10 RX ADMIN — SIMVASTATIN SCH MG: 20 TABLET, FILM COATED ORAL at 19:42

## 2019-01-10 RX ADMIN — IPRATROPIUM BROMIDE AND ALBUTEROL SULFATE SCH ML: .5; 3 SOLUTION RESPIRATORY (INHALATION) at 02:25

## 2019-01-10 RX ADMIN — IPRATROPIUM BROMIDE AND ALBUTEROL SULFATE SCH ML: .5; 3 SOLUTION RESPIRATORY (INHALATION) at 08:03

## 2019-01-10 RX ADMIN — SERTRALINE HYDROCHLORIDE SCH MG: 100 TABLET ORAL at 19:43

## 2019-01-10 RX ADMIN — Medication SCH ML: at 06:05

## 2019-01-10 RX ADMIN — METHYLPREDNISOLONE SODIUM SUCCINATE SCH MG: 40 INJECTION, POWDER, FOR SOLUTION INTRAMUSCULAR; INTRAVENOUS at 23:27

## 2019-01-10 RX ADMIN — SODIUM CHLORIDE SCH MLS/HR: 900 INJECTION INTRAVENOUS at 19:42

## 2019-01-10 RX ADMIN — METHYLPREDNISOLONE SODIUM SUCCINATE SCH MG: 40 INJECTION, POWDER, FOR SOLUTION INTRAMUSCULAR; INTRAVENOUS at 18:34

## 2019-01-10 RX ADMIN — AZITHROMYCIN SCH MG: 250 TABLET, FILM COATED ORAL at 08:06

## 2019-01-10 RX ADMIN — IPRATROPIUM BROMIDE AND ALBUTEROL SULFATE SCH ML: .5; 3 SOLUTION RESPIRATORY (INHALATION) at 15:35

## 2019-01-10 RX ADMIN — Medication SCH ML: at 19:43

## 2019-01-10 NOTE — DIAGNOSTIC IMAGING REPORT
INDICATION: Pneumonia, follow-up.



TIME OF EXAM: 02:07 p.m.



Correlation is made with prior chest from 01/07/2019.



FINDINGS: There has been partial clearing of the infiltrate in

the right upper lobe. Mild infiltrate remains. Remainder of the

lung fields remains clear. No effusion is seen. There is no

pneumothorax.



IMPRESSION: Partial clearing of right upper lobe pneumonia when

compared with exam three days earlier.



Dictated by: 



  Dictated on workstation # [id number]

## 2019-01-10 NOTE — PROGRESS NOTE-HOSPITALIST
Subjective


HPI/CC On Admission


Date Seen by Provider:  Martin 10, 2019


Time Seen by Provider:  09:00


The patient is a 58-year-old white female who reports that she had an episode 

of what she thought to be the flu at about .  She got over that and 

did well enough until about [place] when this occurred again.  She works at 

[employer] and was then scheduled to work several days in row last 

week.  Unable to work Thursday and Friday of last week and return to her 

Saturday and .   evening she began to feel quite fatigued and short 

of breath but was able to finish her shift.  That night she began to have 

rather sharp and unrelenting pain in her right chest.  She was to have picked 

up a grandchild to take them to school on Monday did not feel that she can 

perform this but ultimately did so.  She continued to feel worse and her 

 called an off spring who took her to the clinic to be seen.  It was 

concluded that she was too ill and she was sent on to the emergency room.  The 

emergency room showed her to have a considerable consolidation in the right mid 

lung field.  Her temperature was 101.6 and her white blood count was 22,000.


Subjective/Events-last exam


Patient doing much better


Pneumonia was significant on the right upper lobe


Known asthmatic that had allergy testing but was hesitant about doing the 

allergy injections


Compliant with inhalers


No recent PFT's so that needs to be done


We'll add Singulair


We'll restart home meds of Flonase and Claritin


Has upper respiratory illnesses a lot and she does seem to think it is 

affiliated with allergy season


We'll check x-ray today and give IV steroids in addition to consulting Dr. zAar for follow-up and Pulmicort function test ordering





Review of Systems


Pulmonary:  Dyspnea





Focused Exam


Lactate Level


19 19:35: Lactic Acid Level 0.95








Objective


Exam


Vital Signs





Vital Signs








  Date Time  Temp Pulse Resp B/P (MAP) Pulse Ox O2 Delivery O2 Flow Rate FiO2


 


1/10/19 08:32 98.7 86 18 125/68 (87) 96 Room Air  


 


19 23:51       2.00 





Capillary Refill : Less Than 3 Seconds


General Appearance:  No Apparent Distress, WD/WN, Thin


Respiratory:  Chest Non Tender, Normal Breath Sounds, No Accessory Muscle Use, 

No Respiratory Distress, Decreased Breath Sounds


Cardiovascular:  Regular Rate, Rhythm, No Edema, No Gallop, No JVD, No Murmur, 

Normal Peripheral Pulses


Neurologic/Psychiatric:  Alert, Oriented x3, No Motor/Sensory Deficits, Normal 

Mood/Affect





Results/Procedures


Lab


Laboratory Tests


1/10/19 10:00








Patient resulted labs reviewed.





Assessment/Plan


Assessment and Plan


Assess & Plan/Chief Complaint


Assessment:


Right upper lobe pneumonia


Moderate persistent asthma no previous PFTs done


Hyperlipidemia


Depression





Plan:


Check labs today


Check chest x-ray


Continue antibiotics


Consult Dr. Azar


Needs Pulmicort function test


Singulair added


Home meds





Diagnosis/Problems


Diagnosis/Problems





(1) Pneumonia


Status:  Acute


Qualifiers:  


   Pneumonia type:  due to unspecified organism  Laterality:  right  Lung 

location:  upper lobe of lung  Qualified Codes:  J18.1 - Lobar pneumonia, 

unspecified organism


(2) Asthma exacerbation


Status:  Acute


Qualifiers:  


   Asthma severity:  moderate  Asthma persistence:  persistent  Qualified Codes

:  J45.41 - Moderate persistent asthma with (acute) exacerbation


(3) Hyperlipemia


Status:  Chronic


Qualifiers:  


   Hyperlipidemia type:  mixed hyperlipidemia  Qualified Codes:  E78.2 - Mixed 

hyperlipidemia


(4) Depression


Status:  Chronic


Qualifiers:  


   Depression Type:  unspecified  Qualified Codes:  F32.9 - Major depressive 

disorder, single episode, unspecified


(5) Allergic rhinitis


Status:  Chronic


Qualifiers:  


   Allergic rhinitis trigger:  unspecified  


(6) Sepsis


Status:  Resolved


Qualifiers:  


   Sepsis type:  sepsis due to unspecified organism  Qualified Codes:  A41.9 - 

Sepsis, unspecified organism


Resolution Date/Time:  1/10/19 @ 11:13





Clinical Quality Measures


DVT/VTE Risk/Contraindication:


Risk Factor Score Per Nursin


RFS Level Per Nursing on Admit:  4+=Very High











DL MARSH DO Martin 10, 2019 11:14

## 2019-01-11 VITALS — SYSTOLIC BLOOD PRESSURE: 131 MMHG | DIASTOLIC BLOOD PRESSURE: 76 MMHG

## 2019-01-11 VITALS — SYSTOLIC BLOOD PRESSURE: 120 MMHG | DIASTOLIC BLOOD PRESSURE: 73 MMHG

## 2019-01-11 VITALS — DIASTOLIC BLOOD PRESSURE: 76 MMHG | SYSTOLIC BLOOD PRESSURE: 131 MMHG

## 2019-01-11 LAB
ALBUMIN SERPL-MCNC: 3.8 GM/DL (ref 3.2–4.5)
ALP SERPL-CCNC: 106 U/L (ref 40–136)
ALT SERPL-CCNC: 28 U/L (ref 0–55)
BASOPHILS # BLD AUTO: 0.1 10^3/UL (ref 0–0.1)
BASOPHILS NFR BLD AUTO: 1 % (ref 0–10)
BILIRUB SERPL-MCNC: 0.4 MG/DL (ref 0.1–1)
BUN/CREAT SERPL: 26
CALCIUM SERPL-MCNC: 9.7 MG/DL (ref 8.5–10.1)
CHLORIDE SERPL-SCNC: 103 MMOL/L (ref 98–107)
CO2 SERPL-SCNC: 26 MMOL/L (ref 21–32)
CREAT SERPL-MCNC: 0.72 MG/DL (ref 0.6–1.3)
EOSINOPHIL # BLD AUTO: 0 10^3/UL (ref 0–0.3)
EOSINOPHIL NFR BLD AUTO: 0 % (ref 0–10)
ERYTHROCYTE [DISTWIDTH] IN BLOOD BY AUTOMATED COUNT: 13.2 % (ref 10–14.5)
GFR SERPLBLD BASED ON 1.73 SQ M-ARVRAT: > 60 ML/MIN
GLUCOSE SERPL-MCNC: 152 MG/DL (ref 70–105)
HCT VFR BLD CALC: 37 % (ref 35–52)
HGB BLD-MCNC: 12.1 G/DL (ref 11.5–16)
LYMPHOCYTES # BLD AUTO: 0.7 X 10^3 (ref 1–4)
LYMPHOCYTES NFR BLD AUTO: 6 % (ref 12–44)
MANUAL DIFFERENTIAL PERFORMED BLD QL: NO
MCH RBC QN AUTO: 28 PG (ref 25–34)
MCHC RBC AUTO-ENTMCNC: 33 G/DL (ref 32–36)
MCV RBC AUTO: 86 FL (ref 80–99)
MONOCYTES # BLD AUTO: 0.3 X 10^3 (ref 0–1)
MONOCYTES NFR BLD AUTO: 3 % (ref 0–12)
NEUTROPHILS # BLD AUTO: 10.3 X 10^3 (ref 1.8–7.8)
NEUTROPHILS NFR BLD AUTO: 91 % (ref 42–75)
PLATELET # BLD: 335 10^3/UL (ref 130–400)
PMV BLD AUTO: 11.5 FL (ref 7.4–10.4)
POTASSIUM SERPL-SCNC: 4.1 MMOL/L (ref 3.6–5)
PROT SERPL-MCNC: 6.9 GM/DL (ref 6.4–8.2)
RBC # BLD AUTO: 4.33 10^6/UL (ref 4.35–5.85)
SODIUM SERPL-SCNC: 143 MMOL/L (ref 135–145)
WBC # BLD AUTO: 11.3 10^3/UL (ref 4.3–11)

## 2019-01-11 RX ADMIN — IPRATROPIUM BROMIDE AND ALBUTEROL SULFATE SCH ML: .5; 3 SOLUTION RESPIRATORY (INHALATION) at 03:17

## 2019-01-11 RX ADMIN — METHYLPREDNISOLONE SODIUM SUCCINATE SCH MG: 40 INJECTION, POWDER, FOR SOLUTION INTRAMUSCULAR; INTRAVENOUS at 06:29

## 2019-01-11 RX ADMIN — Medication SCH ML: at 06:30

## 2019-01-11 RX ADMIN — DOCOSANOL 10 % TOPICAL CREAM SCH GM: at 08:56

## 2019-01-11 RX ADMIN — AZITHROMYCIN SCH MG: 250 TABLET, FILM COATED ORAL at 08:55

## 2019-01-11 RX ADMIN — IPRATROPIUM BROMIDE AND ALBUTEROL SULFATE SCH ML: .5; 3 SOLUTION RESPIRATORY (INHALATION) at 06:58

## 2019-01-11 NOTE — PULMONARY PROGRESS NOTE
Sepsis Event


Evaluation


Height, Weight, BMI


Height: 5'4.00"


Weight: 112lbs. 0.0oz. 50.181590de; 19.2 BMI


Method:Stated





Exam


Exam





Vital Signs








  Date Time  Temp Pulse Resp B/P (MAP) Pulse Ox O2 Delivery O2 Flow Rate FiO2


 


1/11/19 07:07     96 Room Air  


 


1/11/19 06:59     96 Room Air  


 


1/11/19 03:17     95 Room Air  


 


1/11/19 00:11 98.7 107 20 120/73 (89) 95 Room Air  


 


1/10/19 23:14     95 Room Air  


 


1/10/19 19:40      Room Air  


 


1/10/19 15:35     94 Room Air  


 


1/10/19 15:34 99.2 85 16 138/77 (97) 96 Room Air  


 


1/10/19 12:12     94 Room Air  


 


1/10/19 08:32 98.7 86 18 125/68 (87) 96 Room Air  


 


1/10/19 08:04     96 Room Air  














I & O 


 


 1/11/19





 07:00


 


Intake Total 1930 ml


 


Output Total 3000 ml


 


Balance -1070 ml








Height & Weight


Height: 5'4.00"


Weight: 112lbs. 0.0oz. 50.571733qk; 19.2 BMI


Method:Stated


General Appearance:  No Apparent Distress


HEENT:  Pharynx Normal, Moist Mucous Membranes


Neck:  Full Range of Motion, Normal Inspection, Non Tender, Supple


Respiratory:  Chest Non Tender, Normal Breath Sounds, No Accessory Muscle Use, 

No Respiratory Distress, Decreased Breath Sounds (right upper lobe)


Cardiovascular:  Regular Rate, Rhythm, No Edema, No Gallop, No JVD, No Murmur, 

Normal Peripheral Pulses


Capillary Refill:  Less Than 3 Seconds


Gastrointestinal:  normal bowel sounds, non tender, soft


Extremity:  Normal Capillary Refill, Normal Inspection


Neurologic/Psychiatric:  Alert, Oriented x3, No Motor/Sensory Deficits, Normal 

Mood/Affect


Skin:  Normal Color, Warm/Dry


Lymphatic:  No Adenopathy





Results


Lab


Laboratory Tests


1/10/19 10:00








1/11/19 06:04











Assessment/Plan


Assessment/Plan


RUL Pneumonia 


   Azithromycin and ceftriaxone


   Pan cultures pending 


   -Will need out patient f/u CXR 6-8 wks after discharge to ensure complete 

resolution. 


Asthma exacerbation


   Breo, duonebs Q4


   solumederol 40 Q6h


Severe allergies 


   Continue home medications 


   KWAME Baez DO Jan 11, 2019 07:22

## 2019-01-11 NOTE — DISCHARGE SUMMARY-HOSPITALIST
Diagnosis/Chief Complaint


Date of Admission


2019 at 21:50


Date of Discharge





Discharge Date:  2019


Admission Diagnosis


Sepsis/right middle lobe pneumonia


Discharge Diagnosis





(1) Pneumonia


Status:  Acute


(2) Asthma exacerbation


Status:  Acute


(3) Hyperlipemia


Status:  Chronic


(4) Depression


Status:  Chronic


(5) Allergic rhinitis


Status:  Chronic


(6) Sepsis


Status:  Resolved





Discharge Summary


Discharge Physical Exam


Allergies:  


Coded Allergies:  


     amoxicillin (Verified  Allergy, Intermediate, LIPS SWELL, 16)


     clavulanic acid (Verified  Allergy, Intermediate, LIPS SWELL, 16)


     codeine (Verified  Allergy, Intermediate, "KNOCKED HER OUT", 16)


Vitals & I&Os





Vital Signs








  Date Time  Temp Pulse Resp B/P (MAP) Pulse Ox O2 Delivery O2 Flow Rate FiO2


 


19 11:06  109 18 131/76 93 Room Air  


 


19 08:00 98.6       


 


19 07:11        21


 


19 23:51       2.00 








General Appearance:  No Apparent Distress, WD/WN


Respiratory:  Chest Non Tender, Lungs Clear, Normal Breath Sounds, No Accessory 

Muscle Use, No Respiratory Distress


Cardiovascular:  Regular Rate, Rhythm, No Edema, No Gallop, No JVD, No Murmur, 

Normal Peripheral Pulses


Neurologic/Psychiatric:  Alert, Oriented x3, No Motor/Sensory Deficits, Normal 

Mood/Affect





Hospital Course


Hospital course: Patient had an uneventful hospital course she was placed on IV 

antibiotics for pneumonia with nebulizer treatments and oxygen supplementation.

  Dr. Azar pulmonology was consulted due to the extensive nature of her 

asthma so Singulair was ordered along with resuming all of her home medications 

including inhaled corticosteroids.  IV steroids were initiated with good 

response so she will have close follow-up at discharge with Dr. Dean her 

primary care provider on Monday in addition to finishing antibiotics and a 

steroid taper maintaining on Singulair with close follow-up and allergy 

injections may be beneficial for the patient but that will be up to Dr. Dean 

and Dr. Azar.


Labs (last 24 hrs)


Laboratory Tests


19 06:04: 


White Blood Count 11.3H, Red Blood Count 4.33L, Hemoglobin 12.1, Hematocrit 37, 

Mean Corpuscular Volume 86, Mean Corpuscular Hemoglobin 28, Mean Corpuscular 

Hemoglobin Concent 33, Red Cell Distribution Width 13.2, Platelet Count 335, 

Mean Platelet Volume 11.5H, Neutrophils (%) (Auto) 91H, Lymphocytes (%) (Auto) 

6L, Monocytes (%) (Auto) 3, Eosinophils (%) (Auto) 0, Basophils (%) (Auto) 1, 

Neutrophils # (Auto) 10.3H, Lymphocytes # (Auto) 0.7L, Monocytes # (Auto) 0.3, 

Eosinophils # (Auto) 0.0, Basophils # (Auto) 0.1, Sodium Level 143, Potassium 

Level 4.1, Chloride Level 103, Carbon Dioxide Level 26, Anion Gap 14, Blood 

Urea Nitrogen 19H, Creatinine 0.72, Estimat Glomerular Filtration Rate > 60, BUN

/Creatinine Ratio 26, Glucose Level 152H, Calcium Level 9.7, Corrected Calcium 

9.9, Total Bilirubin 0.4, Aspartate Amino Transf (AST/SGOT) 12, Alanine 

Aminotransferase (ALT/SGPT) 28, Alkaline Phosphatase 106, Total Protein 6.9, 

Albumin 3.8





Microbiology


19 Blood Culture - Preliminary, Resulted


         No growth


19 Influenza Types A,B Antigen (SANDIP) - Final, Complete


         


Patient resulted labs reviewed.


Pending Labs


Laboratory Tests


19 06:04: 


White Blood Count 11.3, Red Blood Count 4.33, Hemoglobin 12.1, Hematocrit 37, 

Mean Corpuscular Volume 86, Mean Corpuscular Hemoglobin 28, Mean Corpuscular 

Hemoglobin Concent 33, Red Cell Distribution Width 13.2, Platelet Count 335, 

Mean Platelet Volume 11.5, Neutrophils (%) (Auto) 91, Lymphocytes (%) (Auto) 6, 

Monocytes (%) (Auto) 3, Eosinophils (%) (Auto) 0, Basophils (%) (Auto) 1, 

Neutrophils # (Auto) 10.3, Lymphocytes # (Auto) 0.7, Monocytes # (Auto) 0.3, 

Eosinophils # (Auto) 0.0, Basophils # (Auto) 0.1, Sodium Level 143, Potassium 

Level 4.1, Chloride Level 103, Carbon Dioxide Level 26, Anion Gap 14, Blood 

Urea Nitrogen 19, Creatinine 0.72, Estimat Glomerular Filtration Rate > 60, BUN/

Creatinine Ratio 26, Glucose Level 152, Calcium Level 9.7, Corrected Calcium 9.9

, Total Bilirubin 0.4, Aspartate Amino Transf (AST/SGOT) 12, Alanine 

Aminotransferase (ALT/SGPT) 28, Alkaline Phosphatase 106, Total Protein 6.9, 

Albumin 3.8








Discussion & Recommendations


Discharge Planning:  <30 minutes discharge planning





Discharge


Home Medications:





Active Scripts


Active


Prednisone 10 Mg Tab.ds.pk 10 Mg PO DAILY


     Take 6 tabs(60mg)daily,decrease by 1 tab(10MG)daily.


Cefdinir 300 Mg Capsule 300 Mg PO BID


Montelukast Sodium 10 Mg Tablet 10 Mg PO HS


Reported


Ranitidine HCl 150 Mg Tablet 150 Mg PO DAILY PRN


Flonase Allergy Relief (Fluticasone Propionate) 9.9 Ml Spray.susp 2 Spray NS 

DAILY PRN


Sertraline HCl 100 Mg Tablet 100 Mg PO HS


Daily Multiple Vitamin (Multivitamin) 1 Each Tablet 1 Tab PO DAILY


Albuterol Sulfate 1.25 Mg/3 Ml Vial.neb 3 Ml NEB Q4H PRN


Breo Ellipta 200-25 Mcg INH (Fluticasone/Vilanterol) 1 Each Blst.w.dev 1 Puff 

INH DAILY


Proair Hfa (Albuterol Sulfate) 1 Puff Puff 2 Puff INH Q4H PRN


Calcium 600 + Vit D 200 Tablet (Calcium Carbonate/Vitamin D3) 1 Each Tablet 1 

Tab PO BID


Claritin (Loratadine) 10 Mg Tablet 10 Mg PO DAILY PRN


Premarin (Estrogens Conjugated) 30 Gm Cr  VG MOTH


Simvastatin 20 Mg Tablet 20 Mg PO HS





Instructions to patient/family


Please see electronic discharge instructions given to patient.





Clinical Quality Measures


DVT/VTE Risk/Contraindication:


Risk Factor Score Per Nursin


RFS Level Per Nursing on Admit:  4+=Very High





Problem Qualifiers





(1) Pneumonia:  


Pneumonia type:  due to unspecified organism  Laterality:  right  Lung location

:  upper lobe of lung  Qualified Codes:  J18.1 - Lobar pneumonia, unspecified 

organism


(2) Asthma exacerbation:  


Asthma severity:  moderate  Asthma persistence:  persistent  Qualified Codes:  

J45.41 - Moderate persistent asthma with (acute) exacerbation


(3) Hyperlipemia:  


Hyperlipidemia type:  mixed hyperlipidemia  Qualified Codes:  E78.2 - Mixed 

hyperlipidemia


(4) Depression:  


Depression Type:  unspecified  Qualified Codes:  F32.9 - Major depressive 

disorder, single episode, unspecified


(5) Allergic rhinitis:  


Allergic rhinitis trigger:  unspecified  


(6) Sepsis:  


Sepsis type:  sepsis due to unspecified organism  Qualified Codes:  A41.9 - 

Sepsis, unspecified organism








DL MARSH DO 2019 10:36

## 2019-01-23 ENCOUNTER — HOSPITAL ENCOUNTER (OUTPATIENT)
Dept: HOSPITAL 75 - LABNPT | Age: 59
End: 2019-01-23
Attending: FAMILY MEDICINE
Payer: COMMERCIAL

## 2019-01-23 DIAGNOSIS — B99.9: Primary | ICD-10-CM

## 2019-01-23 LAB
BASOPHILS # BLD AUTO: 0 10^3/UL (ref 0–0.1)
BASOPHILS NFR BLD AUTO: 0 % (ref 0–10)
BASOPHILS NFR BLD MANUAL: 1 %
EOSINOPHIL # BLD AUTO: 0.2 10^3/UL (ref 0–0.3)
EOSINOPHIL NFR BLD AUTO: 2 % (ref 0–10)
EOSINOPHIL NFR BLD MANUAL: 3 %
ERYTHROCYTE [DISTWIDTH] IN BLOOD BY AUTOMATED COUNT: 13.7 % (ref 10–14.5)
HCT VFR BLD CALC: 38 % (ref 35–52)
HGB BLD-MCNC: 12 G/DL (ref 11.5–16)
LYMPHOCYTES # BLD AUTO: 1.7 X 10^3 (ref 1–4)
LYMPHOCYTES NFR BLD AUTO: 19 % (ref 12–44)
MCH RBC QN AUTO: 28 PG (ref 25–34)
MCHC RBC AUTO-ENTMCNC: 31 G/DL (ref 32–36)
MCV RBC AUTO: 90 FL (ref 80–99)
MONOCYTES # BLD AUTO: 0.8 X 10^3 (ref 0–1)
MONOCYTES NFR BLD AUTO: 8 % (ref 0–12)
MONOCYTES NFR BLD: 2 %
NEUTROPHILS # BLD AUTO: 6.4 X 10^3 (ref 1.8–7.8)
NEUTROPHILS NFR BLD AUTO: 71 % (ref 42–75)
NEUTS BAND NFR BLD MANUAL: 74 %
NEUTS BAND NFR BLD: 0 %
PLATELET # BLD: 413 10^3/UL (ref 130–400)
PMV BLD AUTO: 11.9 FL (ref 7.4–10.4)
RBC MORPH BLD: NORMAL
RETICS #: 47 10E9/L (ref 24–90)
RETICS/RBC NFR: 1.1 % (ref 0.5–2.4)
TOXIC GRANULES BLD QL SMEAR: (no result)
VARIANT LYMPHS NFR BLD MANUAL: 1 %
VARIANT LYMPHS NFR BLD MANUAL: 19 %
WBC # BLD AUTO: 9.1 10^3/UL (ref 4.3–11)

## 2019-01-23 PROCEDURE — 85027 COMPLETE CBC AUTOMATED: CPT

## 2019-01-23 PROCEDURE — 85007 BL SMEAR W/DIFF WBC COUNT: CPT

## 2019-01-23 PROCEDURE — 85045 AUTOMATED RETICULOCYTE COUNT: CPT

## 2019-02-27 ENCOUNTER — HOSPITAL ENCOUNTER (OUTPATIENT)
Dept: HOSPITAL 75 - RT | Age: 59
End: 2019-02-27
Attending: NURSE PRACTITIONER
Payer: COMMERCIAL

## 2019-02-27 DIAGNOSIS — J45.909: ICD-10-CM

## 2019-02-27 DIAGNOSIS — R06.00: ICD-10-CM

## 2019-02-27 DIAGNOSIS — J18.9: Primary | ICD-10-CM

## 2019-02-27 LAB
BUN/CREAT SERPL: 21
CREAT SERPL-MCNC: 0.78 MG/DL (ref 0.6–1.3)
GFR SERPLBLD BASED ON 1.73 SQ M-ARVRAT: > 60 ML/MIN

## 2019-02-27 PROCEDURE — 36415 COLL VENOUS BLD VENIPUNCTURE: CPT

## 2019-02-27 PROCEDURE — 82565 ASSAY OF CREATININE: CPT

## 2019-02-27 PROCEDURE — 71260 CT THORAX DX C+: CPT

## 2019-02-27 PROCEDURE — 94729 DIFFUSING CAPACITY: CPT

## 2019-02-27 PROCEDURE — 94726 PLETHYSMOGRAPHY LUNG VOLUMES: CPT

## 2019-02-27 PROCEDURE — 94060 EVALUATION OF WHEEZING: CPT

## 2019-02-27 PROCEDURE — 84520 ASSAY OF UREA NITROGEN: CPT

## 2019-02-27 NOTE — DIAGNOSTIC IMAGING REPORT
PROCEDURE: CT chest with contrast only.



TECHNIQUE: Multiple contiguous axial images were obtained through

the chest after administration of intravenous contrast.



INDICATION: History of pneumonia, shortness of air with activity,

asthma and dyspnea.



Study compared 06/30/2015.



There is no significant thickening of the airways. No

bronchiectasis. No bullous disease, blebs or air cyst. No

interstitial disease or parenchymal destruction. No effusion or

pneumothorax. No pneumomediastinum. There is some very minimal

vickey-fissural subsegmental atelectasis in the right middle lobe.

No findings suggestive of edema or pneumonia. The thoracic aorta

is patent and nonaneurysmal. There is no evidence for central PE.

No acute soft tissue or osseous chest wall lesion. The visualized

upper abdomen unremarkable.



IMPRESSION:



Dictated by: 



  Dictated on workstation # [id number]

## 2019-03-04 ENCOUNTER — HOSPITAL ENCOUNTER (OUTPATIENT)
Dept: HOSPITAL 75 - LAB | Age: 59
End: 2019-03-04
Attending: NURSE PRACTITIONER
Payer: COMMERCIAL

## 2019-03-04 DIAGNOSIS — J18.9: ICD-10-CM

## 2019-03-04 DIAGNOSIS — R06.00: ICD-10-CM

## 2019-03-04 DIAGNOSIS — R91.8: ICD-10-CM

## 2019-03-04 DIAGNOSIS — J45.909: Primary | ICD-10-CM

## 2019-03-04 LAB
BASOPHILS # BLD AUTO: 0 10^3/UL (ref 0–0.1)
BASOPHILS NFR BLD AUTO: 0 % (ref 0–10)
BUN/CREAT SERPL: 15
CREAT SERPL-MCNC: 0.98 MG/DL (ref 0.6–1.3)
EOSINOPHIL # BLD AUTO: 0.3 10^3/UL (ref 0–0.3)
EOSINOPHIL NFR BLD AUTO: 5 % (ref 0–10)
ERYTHROCYTE [DISTWIDTH] IN BLOOD BY AUTOMATED COUNT: 15 % (ref 10–14.5)
GFR SERPLBLD BASED ON 1.73 SQ M-ARVRAT: 58 ML/MIN
HCT VFR BLD CALC: 41 % (ref 35–52)
HGB BLD-MCNC: 13.3 G/DL (ref 11.5–16)
LYMPHOCYTES # BLD AUTO: 1.3 X 10^3 (ref 1–4)
LYMPHOCYTES NFR BLD AUTO: 21 % (ref 12–44)
MANUAL DIFFERENTIAL PERFORMED BLD QL: NO
MCH RBC QN AUTO: 28 PG (ref 25–34)
MCHC RBC AUTO-ENTMCNC: 32 G/DL (ref 32–36)
MCV RBC AUTO: 87 FL (ref 80–99)
MONOCYTES # BLD AUTO: 0.4 X 10^3 (ref 0–1)
MONOCYTES NFR BLD AUTO: 6 % (ref 0–12)
NEUTROPHILS # BLD AUTO: 4.3 X 10^3 (ref 1.8–7.8)
NEUTROPHILS NFR BLD AUTO: 68 % (ref 42–75)
PLATELET # BLD: 245 10^3/UL (ref 130–400)
PMV BLD AUTO: 10.6 FL (ref 7.4–10.4)
WBC # BLD AUTO: 6.3 10^3/UL (ref 4.3–11)

## 2019-03-04 PROCEDURE — 84520 ASSAY OF UREA NITROGEN: CPT

## 2019-03-04 PROCEDURE — 82785 ASSAY OF IGE: CPT

## 2019-03-04 PROCEDURE — 86003 ALLG SPEC IGE CRUDE XTRC EA: CPT

## 2019-03-04 PROCEDURE — 85025 COMPLETE CBC W/AUTO DIFF WBC: CPT

## 2019-03-04 PROCEDURE — 36415 COLL VENOUS BLD VENIPUNCTURE: CPT

## 2019-03-04 PROCEDURE — 82565 ASSAY OF CREATININE: CPT

## 2019-03-04 PROCEDURE — 82784 ASSAY IGA/IGD/IGG/IGM EACH: CPT

## 2019-06-27 ENCOUNTER — HOSPITAL ENCOUNTER (OUTPATIENT)
Dept: HOSPITAL 75 - RAD | Age: 59
End: 2019-06-27
Attending: NURSE PRACTITIONER
Payer: COMMERCIAL

## 2019-06-27 DIAGNOSIS — J18.9: ICD-10-CM

## 2019-06-27 DIAGNOSIS — J45.909: Primary | ICD-10-CM

## 2019-06-27 LAB
BUN/CREAT SERPL: 19
CREAT SERPL-MCNC: 0.86 MG/DL (ref 0.6–1.3)
GFR SERPLBLD BASED ON 1.73 SQ M-ARVRAT: > 60 ML/MIN

## 2019-06-27 PROCEDURE — 71260 CT THORAX DX C+: CPT

## 2019-06-27 PROCEDURE — 84520 ASSAY OF UREA NITROGEN: CPT

## 2019-06-27 PROCEDURE — 82565 ASSAY OF CREATININE: CPT

## 2019-06-27 PROCEDURE — 36415 COLL VENOUS BLD VENIPUNCTURE: CPT

## 2019-07-03 ENCOUNTER — HOSPITAL ENCOUNTER (OUTPATIENT)
Dept: HOSPITAL 75 - PREOP | Age: 59
Discharge: HOME | End: 2019-07-03
Attending: INTERNAL MEDICINE
Payer: COMMERCIAL

## 2019-07-03 VITALS — WEIGHT: 138 LBS | BODY MASS INDEX: 23.56 KG/M2 | HEIGHT: 64 IN

## 2019-07-03 DIAGNOSIS — Z01.818: Primary | ICD-10-CM

## 2019-07-11 ENCOUNTER — HOSPITAL ENCOUNTER (OUTPATIENT)
Dept: HOSPITAL 75 - ENDO | Age: 59
Discharge: HOME | End: 2019-07-11
Attending: INTERNAL MEDICINE
Payer: COMMERCIAL

## 2019-07-11 VITALS — HEIGHT: 64 IN | BODY MASS INDEX: 23.56 KG/M2 | WEIGHT: 138 LBS

## 2019-07-11 VITALS — SYSTOLIC BLOOD PRESSURE: 134 MMHG | DIASTOLIC BLOOD PRESSURE: 63 MMHG

## 2019-07-11 VITALS — DIASTOLIC BLOOD PRESSURE: 63 MMHG | SYSTOLIC BLOOD PRESSURE: 124 MMHG

## 2019-07-11 VITALS — DIASTOLIC BLOOD PRESSURE: 53 MMHG | SYSTOLIC BLOOD PRESSURE: 117 MMHG

## 2019-07-11 VITALS — DIASTOLIC BLOOD PRESSURE: 65 MMHG | SYSTOLIC BLOOD PRESSURE: 141 MMHG

## 2019-07-11 VITALS — DIASTOLIC BLOOD PRESSURE: 72 MMHG | SYSTOLIC BLOOD PRESSURE: 105 MMHG

## 2019-07-11 VITALS — DIASTOLIC BLOOD PRESSURE: 69 MMHG | SYSTOLIC BLOOD PRESSURE: 162 MMHG

## 2019-07-11 VITALS — SYSTOLIC BLOOD PRESSURE: 119 MMHG | DIASTOLIC BLOOD PRESSURE: 60 MMHG

## 2019-07-11 VITALS — DIASTOLIC BLOOD PRESSURE: 62 MMHG | SYSTOLIC BLOOD PRESSURE: 133 MMHG

## 2019-07-11 VITALS — DIASTOLIC BLOOD PRESSURE: 71 MMHG | SYSTOLIC BLOOD PRESSURE: 112 MMHG

## 2019-07-11 VITALS — SYSTOLIC BLOOD PRESSURE: 119 MMHG | DIASTOLIC BLOOD PRESSURE: 50 MMHG

## 2019-07-11 VITALS — SYSTOLIC BLOOD PRESSURE: 138 MMHG | DIASTOLIC BLOOD PRESSURE: 67 MMHG

## 2019-07-11 VITALS — DIASTOLIC BLOOD PRESSURE: 68 MMHG | SYSTOLIC BLOOD PRESSURE: 142 MMHG

## 2019-07-11 VITALS — SYSTOLIC BLOOD PRESSURE: 139 MMHG | DIASTOLIC BLOOD PRESSURE: 71 MMHG

## 2019-07-11 VITALS — SYSTOLIC BLOOD PRESSURE: 148 MMHG | DIASTOLIC BLOOD PRESSURE: 81 MMHG

## 2019-07-11 VITALS — SYSTOLIC BLOOD PRESSURE: 131 MMHG | DIASTOLIC BLOOD PRESSURE: 89 MMHG

## 2019-07-11 VITALS — SYSTOLIC BLOOD PRESSURE: 125 MMHG | DIASTOLIC BLOOD PRESSURE: 80 MMHG

## 2019-07-11 DIAGNOSIS — R05: ICD-10-CM

## 2019-07-11 DIAGNOSIS — J18.9: Primary | ICD-10-CM

## 2019-07-11 DIAGNOSIS — R91.8: ICD-10-CM

## 2019-07-11 DIAGNOSIS — J45.909: ICD-10-CM

## 2019-07-11 LAB
APPEARANCE FLD: (no result)
BODY FLUID SOURCE: (no result)
COLOR FLD: COLORLESS
LYMPHOCYTES NFR FLD MANUAL: 0 %
OTHER CELLS FLD MANUAL: 26 %

## 2019-07-11 PROCEDURE — 87070 CULTURE OTHR SPECIMN AEROBIC: CPT

## 2019-07-11 PROCEDURE — 87206 SMEAR FLUORESCENT/ACID STAI: CPT

## 2019-07-11 PROCEDURE — 87116 MYCOBACTERIA CULTURE: CPT

## 2019-07-11 PROCEDURE — 94640 AIRWAY INHALATION TREATMENT: CPT

## 2019-07-11 PROCEDURE — 87077 CULTURE AEROBIC IDENTIFY: CPT

## 2019-07-11 PROCEDURE — 87185 SC STD ENZYME DETCJ PER NZM: CPT

## 2019-07-11 PROCEDURE — 71045 X-RAY EXAM CHEST 1 VIEW: CPT

## 2019-07-11 PROCEDURE — 87205 SMEAR GRAM STAIN: CPT

## 2019-07-11 PROCEDURE — 87101 SKIN FUNGI CULTURE: CPT

## 2019-07-11 PROCEDURE — 87015 SPECIMEN INFECT AGNT CONCNTJ: CPT

## 2019-07-11 PROCEDURE — 89051 BODY FLUID CELL COUNT: CPT

## 2019-07-11 NOTE — DIAGNOSTIC IMAGING REPORT
INDICATION: 8L38 hours a.m.



COMPARISON: 01/10/2019



FINDINGS:  

Heart and mediastinal silhouette are normal in appearance. There

is no pneumothorax or pleural fluid following bronchoscopy. There

is resolution of right perihilar infiltrate compared to

01/10/2019.



IMPRESSION:

No pneumothorax or pleural fluid following bronchoscopy. There is

resolution of the right perihilar infiltrate compared to

01/10/2019. There is hyperinflation compatible COPD.



Dictated by: 



  Dictated on workstation # [id number]

## 2019-07-11 NOTE — XMS REPORT
Continuity of Care Document

                             Created on: 2019



ISAI BERMUDEZ

External Reference #: [id number]

: 1960

Sex: Female



Demographics







                          Address                   [address] [place]  [address]

 

                          Home Phone                [phone] x

 

                          Preferred Language        Unknown

 

                          Marital Status            Unknown

 

                          Taoism Affiliation     Unknown

 

                          Race                      Unknown

 

                          Ethnic Group              Unknown





Author







                          Organization              Unknown

 

                          Address                   Unknown

 

                          Phone                     [phone]



              



Allergies

      





             Active              Description              Code              Type              Severity

                Reaction              Onset              Reported/Identified              Relationship

 to Patient                             Clinical Status        

 

                    Yes                 No Allergy Information Available              [id number]           

             Drug Allergy              Unknown              N/A                             2015

                                                             

 

                Yes              acetaminophen              [id number]              Drug Allergy      

                Moderate              "KNOCKED HER OU                              2016     

                                                             

 

                Yes              amoxicillin              [id number]              Drug Allergy        

             Moderate              LIPS SWELL                             2016              

                                                 

 

                Yes              clavulanic acid              [id number]              Drug Allergy    

                Moderate              LIPS SWELL                              2016        

                                                             

 

                Yes              amoxicillin              [id number]              Drug Allergy        

             Severe              LIPS SWELL                             2019                

                                                 

 

                Yes              clavulanic acid              [id number]              Drug Allergy    

                Severe              LIPS SWELL                              2019          

                                                             

 

             Yes              codeine              [id number]              Drug Allergy              

Moderate              "KNOCKED HER OU                                  2019           

                                                             



                              



Medications

      



There is no data.                  



Problems

      





             Date Dx Coded              Attending              Type              Code              Diagnosis

                                        Diagnosed By        

 

           2015                             Ot              V76.12                             

         

 

                2015              MONICA GARCIA MD              Ot              V76.12      

                                                             

 

                2015              GABBY BAIG DO              Ot              153.9   

                                                             

 

                2015              GABBY BAIG DO              Ot              153.9   

                                                             

 

             2015              NATALIA CRAWLEY, PIPPA QUINTEROS              Ot              153.9              

                                                 

 

             2015              NATALIA CRAWLEY, PIPPA QUINTEROS              Ot              153.9              

                                                 

 

             2015              NATALIA CRAWLEY, PIPPA QUINTEROS              Ot              153.9              

MALIGNANT JACOB COLON NOS                          

 

           10/02/2015                             Ot              V76.12                             

         

 

           2015                             Ot              V76.12                             

         

 

                2016              MONICA GARCIA MD, Ot              Z12.31      

                                                             

 

             10/17/2016                             Ot              V76.12              OTH SCREEN MAMMO-

MALIGN NEOPLASM OF FELIPE                          

 

                10/17/2016              MONICA GARCIA MD              Ot              V76.12      

                          OTH SCREEN MAMMO-MALIGN NEOPLASM OF FELIPE                       

 

                10/17/2016              GABBY BAIG DO              Ot              153.9   

                          MALIGNANT JACOB COLON NOS                       

 

                10/17/2016              GABBY BAIG DO              Ot              153.9   

                          MALIGNANT JACOB COLON NOS                       

 

                10/17/2016              GARCIA MD, MONICA L              Ot              Z12.31      

                          ENCNTR SCREEN MAMMOGRAM FOR MALIGNANT NE                       

 

             10/19/2016                             Ot              V76.12              OTH SCREEN MAMMO-

MALIGN NEOPLASM OF FELIPE                          

 

                10/19/2016              MONICA GARCIA MD              Ot              V76.12      

                          OTH SCREEN MAMMO-MALIGN NEOPLASM OF FELIPE                       

 

                10/19/2016              JOVANNI JOHNSTON, SYBILSANJANA              Ot              153.9   

                          MALIGNANT JACOB COLON NOS                       

 

                10/19/2016              JOVANNI JOHNSTON, LASHAEROUTIE              Ot              153.9   

                          MALIGNANT JACOB COLON NOS                       

 

                10/19/2016              MONICA GARCIA MD              Ot              Z12.31      

                          ENCNTR SCREEN MAMMOGRAM FOR MALIGNANT NE                       

 

                2016              ROSALES COOK MD              Ot              Z01.818         

                          ENCOUNTER FOR OTHER PREPROCEDURAL EXAMIN                       

 

                2016              ROSALES COOK MD              Ot              Z12.11          

                          ENCOUNTER FOR SCREENING FOR MALIGNANT NE                       

 

                2016              ROSALES COOK MD              Ot              Z85.048         

                          PRSNL HX OF MALIG NEOPLM OF RECTUM, RECT                       

 

                2016              ROSALES COOK MD              Ot              Z01.818         

                          ENCOUNTER FOR OTHER PREPROCEDURAL EXAMIN                       

 

                2016              ROSALES COOK MD              Ot              Z12.11          

                          ENCOUNTER FOR SCREENING FOR MALIGNANT NE                       

 

                2016              ROSALES COOK MD              Ot              Z85.048         

                          PRSNL HX OF MALIG NEOPLM OF RECTUM, RECT                       

 

                2016              ROSALES COOK MD              Ot              K57.30          

                          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                       

 

                2016              ROSALES COOK MD              Ot              K63.5           

                          POLYP OF COLON                       

 

                2016              ROSALES COOK MD              Ot              K64.1           

                          SECOND DEGREE HEMORRHOIDS                       

 

                2016              ROSALES COOK MD              Ot              Z12.11          

                          ENCOUNTER FOR SCREENING FOR MALIGNANT NE                       

 

                2016              ROSALES COOK MD              Ot              Z85.048         

                          PRSNL HX OF MALIG NEOPLM OF RECTUM, RECT                       

 

                2016              ROSALES COOK MD              Ot              K57.30          

                          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                       

 

                2016              ROSALES COOK MD              Ot              K63.5           

                          POLYP OF COLON                       

 

                2016              ROSALES COOK MD              Ot              K64.1           

                          SECOND DEGREE HEMORRHOIDS                       

 

                2016              ROSALES COOK MD              Ot              Z12.11          

                          ENCOUNTER FOR SCREENING FOR MALIGNANT NE                       

 

                2016              ROSALES COOK MD, Ot              Z85.048         

                          PRSNL HX OF MALIG NEOPLM OF RECTUM, RECT                       

 

                2016              ROSALES COOK MD, Ot              K57.30          

                          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                       

 

                2016              ROSALES COOK MD, Ot              K63.5           

                          POLYP OF COLON                       

 

                2016              ROSALES COOK MD, Ot              K64.1           

                          SECOND DEGREE HEMORRHOIDS                       

 

                2016              ROSALES COOK MD, Ot              Z12.11          

                          ENCOUNTER FOR SCREENING FOR MALIGNANT NE                       

 

                2016              ROSALES COOK MD, Ot              Z85.048         

                          PRSNL HX OF MALIG NEOPLM OF RECTUM, RECT                       

 

                2019              RADHA CRAWLEY, MONICA GROSS              Ot              V76.12      

                          OTH SCREEN MAMMO-MALIGN NEOPLASM OF FELIPE                       

 

                2019              JOVANNI JOHNSTON, LASHAEROUSANJANA              Ot              153.9   

                          MALIGNANT JACOB COLON NOS                       

 

                2019              JOVANNI DO, LASHAEROUSANJANA              Ot              153.9   

                          MALIGNANT JACOB COLON NOS                       

 

                2019              MONICA GARCIA MD              Ot              Z12.31      

                          ENCNTR SCREEN MAMMOGRAM FOR MALIGNANT NE                       

 

                2019              MONICA GARCIA MD              Ot              V76.12      

                          OTH SCREEN MAMMO-MALIGN NEOPLASM OF FELIPE                       

 

                2019              JOVANNI DO, LASHAEROUTIE              Ot              153.9   

                          MALIGNANT JACOB COLON NOS                       

 

                2019              JOVANNI DO, CHANDROUTIE              Ot              153.9   

                          MALIGNANT JACOB COLON NOS                       

 

                2019              MONICA GARCIA MD              Ot              Z12.31      

                          ENCNTR SCREEN MAMMOGRAM FOR MALIGNANT NE                       

 

                2019              VIRGIL RANDALL MD              Ot              A41.9        

                          SEPSIS, UNSPECIFIED ORGANISM                       

 

                2019              VIRGIL RANDALL MD              Ot              B00.9        

                          HERPESVIRAL INFECTION, UNSPECIFIED                       

 

                2019              VIRGIL RANDALL MD              Ot              E78.00       

                          PURE HYPERCHOLESTEROLEMIA, UNSPECIFIED                       

 

                2019              VIRGIL RANDALL MD              Ot              E78.2        

                          MIXED HYPERLIPIDEMIA                       

 

                2019              VIRGIL RANDALL MD              Ot              F32.9        

                          MAJOR DEPRESSIVE DISORDER, SINGLE EPISOD                       

 

                2019              VIRGIL RANDALL MD              Ot              G43.909      

                          MIGRAINE, UNSP, NOT INTRACTABLE, WITHOUT                       

 

                2019              VIRGIL RANDALL MD, Ot              J18.1        

                          LOBAR PNEUMONIA, UNSPECIFIED ORGANISM                       

 

                2019              VIRGIL RANDALL MD              Ot              J30.2        

                          OTHER SEASONAL ALLERGIC RHINITIS                       

 

                2019              VIRGIL RANDALL MD              Ot              J44.9        

                          CHRONIC OBSTRUCTIVE PULMONARY DISEASE, U                       

 

                2019              VIRGIL RANDALL MD              Ot              J45.41       

                          MODERATE PERSISTENT ASTHMA WITH (ACUTE)                        

 

                2019              VIRGIL RANDALL MD              Ot              K52.9        

                          NONINFECTIVE GASTROENTERITIS AND COLITIS                       

 

                2019              VIRGIL RANDALL MD              Ot              K57.90       

                          DVRTCLOS OF INTEST, PART UNSP, W/O PERF                        

 

                2019              VIRGIL RANDALL MD              Ot              M19.91       

                          PRIMARY OSTEOARTHRITIS, UNSPECIFIED SITE                       

 

                2019              VIRGIL RANDALL MD              Ot              Z85.038      

                          PERSONAL HISTORY OF MALIGNANT NEOPLASM O                       

 

                2019              VIRGIL RANDALL MD              Ot              Z86.010      

                          PERSONAL HISTORY OF COLONIC POLYPS                       

 

                2019              VIRGIL RANDALL MD              Ot              Z88.0        

                          ALLERGY STATUS TO PENICILLIN                       

 

                2019              VIRGIL RANDALL MD              Ot              Z88.5        

                          ALLERGY STATUS TO NARCOTIC AGENT STATUS                       

 

                2019              VIRGIL RANDALL MD              Ot              Z88.8        

                          ALLERGY STATUS TO OTH DRUG/MEDS/BIOL SUB                       

 

                2019              RADHA CRAWLEY, MONICA GROSS              Ot              B99.9       

                          UNSPECIFIED INFECTIOUS DISEASE                       

 

                2019              MONICA GARCIA MD              Ot              B99.9       

                          UNSPECIFIED INFECTIOUS DISEASE                       

 

                2019              LEIDA LENNON APRN              Ot              J18.9   

                          PNEUMONIA, UNSPECIFIED ORGANISM                       

 

                2019              LEIDA LENNON APRN              Ot              J45.909 

                          UNSPECIFIED ASTHMA, UNCOMPLICATED                       

 

                2019              LEIDA LENNON APRN              Ot              R06.00  

                          DYSPNEA, UNSPECIFIED                       

 

                2019              LEIDA LENNON APRN              Ot              J18.9   

                          PNEUMONIA, UNSPECIFIED ORGANISM                       

 

                2019              LEIDA LENNON APRN              Ot              J45.909 

                          UNSPECIFIED ASTHMA, UNCOMPLICATED                       

 

                2019              LEIDA LENNON APRN              Ot              R06.00  

                          DYSPNEA, UNSPECIFIED                       

 

                2019              LEIDA LENNON APRN              Ot              R91.8   

                          OTHER NONSPECIFIC ABNORMAL FINDING OF BREEZY                       

 

                03/15/2019              LEIDA LENNON APRN              Ot              J18.9   

                          PNEUMONIA, UNSPECIFIED ORGANISM                       

 

                03/15/2019              LEIDA LENNON APRN              Ot              J45.909 

                          UNSPECIFIED ASTHMA, UNCOMPLICATED                       

 

                03/15/2019              LEIDA LENNON APRN              Ot              R06.00  

                          DYSPNEA, UNSPECIFIED                       

 

                2019              LEIDA LENNON APRN              Ot              J18.9   

                          PNEUMONIA, UNSPECIFIED ORGANISM                       

 

                2019              DERRICK LENNONINE LEAN APRN              Ot              J45.909 

                          UNSPECIFIED ASTHMA, UNCOMPLICATED                       

 

                2019              LEIDA LENNON APRN              Ot              R06.00  

                          DYSPNEA, UNSPECIFIED                       

 

                2019              DERRICK LENNONINE LENA APRN              Ot              R91.8   

                          OTHER NONSPECIFIC ABNORMAL FINDING OF BREEZY                       

 

                2019              LEIDA LENNON APRN              Ot              J18.9   

                          PNEUMONIA, UNSPECIFIED ORGANISM                       

 

                2019              LEIDA LENNON APRN              Ot              J45.909 

                          UNSPECIFIED ASTHMA, UNCOMPLICATED                       

 

                2019              LEIDA LENNON APRN              Ot              R06.00  

                          DYSPNEA, UNSPECIFIED                       

 

                2019              LEIDA LENNON APRN              Ot              R91.8   

                          OTHER NONSPECIFIC ABNORMAL FINDING OF BREEZY                       

 

                2019              LEIDA LENNON APRN              Ot              J18.9   

                          PNEUMONIA, UNSPECIFIED ORGANISM                       

 

                2019              LEIDA LENNON APRN              Ot              J45.909 

                          UNSPECIFIED ASTHMA, UNCOMPLICATED                       

 

                2019              LEIDA LENNON APRN              Ot              R06.00  

                          DYSPNEA, UNSPECIFIED                       

 

                2019              GABBY BAIG DO              Ot              153.9   

                          MALIGNANT JACOB COLON NOS                       

 

                2019              GABBY BAIG DO              Ot              153.9   

                          MALIGNANT JACOB COLON NOS                       

 

                2019              RADHA CRAWLEY, MONICA GROSS              Ot              Z12.31      

                          ENCNTR SCREEN MAMMOGRAM FOR MALIGNANT NE                       

 

                2019              RADHA CRAWLEY, MONICA GROSS              Ot              B99.9       

                          UNSPECIFIED INFECTIOUS DISEASE                       

 

                2019              LEIDA LENNON APRN              Ot              J18.9   

                          PNEUMONIA, UNSPECIFIED ORGANISM                       

 

                2019              LEIDA LENNON APRN              Ot              J45.909 

                          UNSPECIFIED ASTHMA, UNCOMPLICATED                       

 

                2019              LEIDA LENNON APRN              Ot              R06.00  

                          DYSPNEA, UNSPECIFIED                       

 

                2019              LEIDA LENNON APRN              Ot              J18.9   

                          PNEUMONIA, UNSPECIFIED ORGANISM                       

 

                2019              CITLALLI, LEIDA E APRN              Ot              J45.909 

                          UNSPECIFIED ASTHMA, UNCOMPLICATED                       

 

                2019              LEIDA LENNON              Ot              R06.00  

                          DYSPNEA, UNSPECIFIED                       

 

                2019              LEIDA LENNON APRDARVIN              Ot              R91.8   

                          OTHER NONSPECIFIC ABNORMAL FINDING OF BREEZY                       

 

                2019              LEIDA LENNON              Ot              J18.9   

                          PNEUMONIA, UNSPECIFIED ORGANISM                       

 

                2019              LEIDA LENNON              Ot              J45.909 

                          UNSPECIFIED ASTHMA, UNCOMPLICATED                       

 

                2019              KWAME PINEDA DO              Ot              Z01.818       

                          ENCOUNTER FOR OTHER PREPROCEDURAL EXAMIN                       

 

                2019              KWAME PINEDA DO              Ot              Z01.818       

                          ENCOUNTER FOR OTHER PREPROCEDURAL EXAMIN                       



                                                                                
                                                                                
                                                                



Procedures

      



There is no data.                  



Results

      





                    Test                Result              Range        









                                        Influenza virus A and B antigen detection - 19 19:35         









                    FLU RESULT              NEGATIVE FOR INFLUENZA A AND B ANTIGENS BY [facility]        









                                        Complete blood count (CBC) with automated white blood cell (WBC) differential - 

19 19:35         









                          Blood leukocytes automated count (number/volume)              22.4 10*3/uL        

                                        4.3-11.0        

 

                          Blood erythrocytes automated count (number/volume)              4.34 10*6/uL      

                                        4.35-5.85        

 

                          Venous blood hemoglobin measurement (mass/volume)              12.4 g/dL          

                                        11.5-16.0        

 

                    Blood hematocrit (volume fraction)              37 %                35-52        

 

                    Automated erythrocyte mean corpuscular volume              86 [foz_us]              

80-99        

 

                                        Automated erythrocyte mean corpuscular hemoglobin (mass per erythrocyte)        

                          29 pg                     25-34        

 

                                        Automated erythrocyte mean corpuscular hemoglobin concentration measurement (mass/volume)

                          33 g/dL                   32-36        

 

                    Automated erythrocyte distribution width ratio              13.0 %              10.0-

14.5        

 

                    Automated blood platelet count (count/volume)              294 10*3/uL              

130-400        

 

                          Automated blood platelet mean volume measurement              11.5 [foz_us]       

                                        7.4-10.4        

 

                    Automated blood neutrophils/100 leukocytes              88 %                42-75     

   

 

                    Automated blood lymphocytes/100 leukocytes              5 %                 12-44      

  

 

                    Blood monocytes/100 leukocytes              6 %                 0-12        

 

                    Automated blood eosinophils/100 leukocytes              0 %                 0-10       

 

 

                    Automated blood basophils/100 leukocytes              0 %                 0-10        

 

                          Blood neutrophils automated count (number/volume)              19.7 10*3          

                                        1.8-7.8        

 

                          Blood lymphocytes automated count (number/volume)              1.2 10*3           

                                        1.0-4.0        

 

                    Blood monocytes automated count (number/volume)              1.4 10*3              0.0-

1.0        

 

                    Automated eosinophil count              0.0 10*3/uL              0.0-0.3        

 

                    Automated blood basophil count (count/volume)              0.0 10*3/uL              

0.0-0.1        









                                        Blood lactic acid measurement (moles/volume) - 19 19:35         









                    Blood lactic acid measurement (moles/volume)              0.95 mmol/L              0.50-

2.00        









                                        Comprehensive metabolic panel - 19 19:35         









                          Serum or plasma sodium measurement (moles/volume)              137 mmol/L         

                                        135-145        

 

                          Serum or plasma potassium measurement (moles/volume)              3.5 mmol/L      

                                        3.6-5.0        

 

                          Serum or plasma chloride measurement (moles/volume)              97 mmol/L        

                                                

 

                    Carbon dioxide              24 mmol/L              21-32        

 

                          Serum or plasma anion gap determination (moles/volume)              16 mmol/L     

                                        5-14        

 

                          Serum or plasma urea nitrogen measurement (mass/volume)              13 mg/dL     

                                        7-18        

 

                          Serum or plasma creatinine measurement (mass/volume)              0.80 mg/dL      

                                        0.60-1.30        

 

                    Serum or plasma urea nitrogen/creatinine mass ratio              16                  NRG

        

 

                                        Serum or plasma creatinine measurement with calculation of estimated glomerular 

filtration rate              >                         NRG        

 

                          Serum or plasma glucose measurement (mass/volume)              140 mg/dL          

                                                

 

                          Serum or plasma calcium measurement (mass/volume)              9.4 mg/dL          

                                        8.5-10.1        

 

                          Serum or plasma total bilirubin measurement (mass/volume)              1.4 mg/dL  

                                        0.1-1.0        

 

                                        Serum or plasma alkaline phosphatase measurement (enzymatic activity/volume)    

                          110 U/L                           

 

                                        Serum or plasma aspartate aminotransferase measurement (enzymatic activity/volume)

                          20 U/L                    5-34        

 

                                        Serum or plasma alanine aminotransferase measurement (enzymatic activity/volume)

                          27 U/L                    0-55        

 

                          Serum or plasma protein measurement (mass/volume)              6.9 g/dL           

                                        6.4-8.2        

 

                          Serum or plasma albumin measurement (mass/volume)              3.8 g/dL           

                                        3.2-4.5        

 

                    CALCIUM CORRECTED              9.6 mg/dL              8.5-10.1        









                                        Blood manual differential performed detection - 19 19:35         









                    Blood monocytes/100 leukocytes              4 %                 NRG        

 

                    Manual blood segmented neutrophils/100 leukocytes              85 %                NRG

        

 

                    Blood band neutrophils/100 leukocytes              7 %                 NRG        

 

                    Manual blood lymphocytes/100 leukocytes              4 %                 NRG        

 

                    Manual eosinophils/100 leukocytes in nose              0 %                 NRG        

 

                    Manual blood basophils/100 leukocytes              0 %                 NRG        

 

                          Blood erythrocyte morphology finding identification              NORMAL           

                                        NRG        









                                        Bacterial blood culture - 19 19:35         









                    Bacterial blood culture              NG                  NRG        









                                        Bacterial blood culture - 19 21:30         









                    Bacterial blood culture              NG                  NRG        









                                        Complete blood count (CBC) with automated white blood cell (WBC) differential - 

19 04:10         









                          Blood leukocytes automated count (number/volume)              18.9 10*3/uL        

                                        4.3-11.0        

 

                          Blood erythrocytes automated count (number/volume)              4.75 10*6/uL      

                                        4.35-5.85        

 

                          Venous blood hemoglobin measurement (mass/volume)              13.6 g/dL          

                                        11.5-16.0        

 

                    Blood hematocrit (volume fraction)              41 %                35-52        

 

                    Automated erythrocyte mean corpuscular volume              86 [foz_us]              

80-99        

 

                                        Automated erythrocyte mean corpuscular hemoglobin (mass per erythrocyte)        

                          29 pg                     25-34        

 

                                        Automated erythrocyte mean corpuscular hemoglobin concentration measurement (mass/volume)

                          34 g/dL                   32-36        

 

                    Automated erythrocyte distribution width ratio              13.3 %              10.0-

14.5        

 

                    Automated blood platelet count (count/volume)              254 10*3/uL              

130-400        

 

                          Automated blood platelet mean volume measurement              11.6 [foz_us]       

                                        7.4-10.4        

 

                    Automated blood neutrophils/100 leukocytes              95 %                42-75     

   

 

                    Automated blood lymphocytes/100 leukocytes              3 %                 12-44      

  

 

                    Blood monocytes/100 leukocytes              2 %                 0-12        

 

                    Automated blood eosinophils/100 leukocytes              0 %                 0-10       

 

 

                    Automated blood basophils/100 leukocytes              0 %                 0-10        

 

                          Blood neutrophils automated count (number/volume)              18.0 10*3          

                                        1.8-7.8        

 

                          Blood lymphocytes automated count (number/volume)              0.6 10*3           

                                        1.0-4.0        

 

                    Blood monocytes automated count (number/volume)              0.4 10*3              0.0-

1.0        

 

                    Automated eosinophil count              0.0 10*3/uL              0.0-0.3        

 

                    Automated blood basophil count (count/volume)              0.0 10*3/uL              

0.0-0.1        









                                        Comprehensive metabolic panel - 19 04:10         









                          Serum or plasma sodium measurement (moles/volume)              142 mmol/L         

                                        135-145        

 

                          Serum or plasma potassium measurement (moles/volume)              4.0 mmol/L      

                                        3.6-5.0        

 

                          Serum or plasma chloride measurement (moles/volume)              103 mmol/L       

                                                

 

                    Carbon dioxide              26 mmol/L              21-32        

 

                          Serum or plasma anion gap determination (moles/volume)              13 mmol/L     

                                        5-14        

 

                          Serum or plasma urea nitrogen measurement (mass/volume)              16 mg/dL     

                                        7-18        

 

                          Serum or plasma creatinine measurement (mass/volume)              0.76 mg/dL      

                                        0.60-1.30        

 

                    Serum or plasma urea nitrogen/creatinine mass ratio              21                  NRG

        

 

                                        Serum or plasma creatinine measurement with calculation of estimated glomerular 

filtration rate              >                         NRG        

 

                          Serum or plasma glucose measurement (mass/volume)              165 mg/dL          

                                                

 

                          Serum or plasma calcium measurement (mass/volume)              10.0 mg/dL         

                                        8.5-10.1        

 

                          Serum or plasma total bilirubin measurement (mass/volume)              0.8 mg/dL  

                                        0.1-1.0        

 

                                        Serum or plasma alkaline phosphatase measurement (enzymatic activity/volume)    

                          121 U/L                           

 

                                        Serum or plasma aspartate aminotransferase measurement (enzymatic activity/volume)

                          18 U/L                    5-34        

 

                                        Serum or plasma alanine aminotransferase measurement (enzymatic activity/volume)

                          29 U/L                    0-55        

 

                          Serum or plasma protein measurement (mass/volume)              7.2 g/dL           

                                        6.4-8.2        

 

                          Serum or plasma albumin measurement (mass/volume)              4.0 g/dL           

                                        3.2-4.5        

 

                    CALCIUM CORRECTED              10.0 mg/dL              8.5-10.1        









                                        Complete blood count (CBC) with automated white blood cell (WBC) differential - 

01/10/19 10:00         









                          Blood leukocytes automated count (number/volume)              13.2 10*3/uL        

                                        4.3-11.0        

 

                          Blood erythrocytes automated count (number/volume)              3.74 10*6/uL      

                                        4.35-5.85        

 

                          Venous blood hemoglobin measurement (mass/volume)              10.8 g/dL          

                                        11.5-16.0        

 

                    Blood hematocrit (volume fraction)              33 %                35-52        

 

                    Automated erythrocyte mean corpuscular volume              87 [foz_us]              

80-99        

 

                                        Automated erythrocyte mean corpuscular hemoglobin (mass per erythrocyte)        

                          29 pg                     25-34        

 

                                        Automated erythrocyte mean corpuscular hemoglobin concentration measurement (mass/volume)

                          33 g/dL                   32-36        

 

                    Automated erythrocyte distribution width ratio              13.8 %              10.0-

14.5        

 

                    Automated blood platelet count (count/volume)              304 10*3/uL              

130-400        

 

                          Automated blood platelet mean volume measurement              11.1 [foz_us]       

                                        7.4-10.4        

 

                    Automated blood neutrophils/100 leukocytes              86 %                42-75     

   

 

                    Automated blood lymphocytes/100 leukocytes              8 %                 12-44      

  

 

                    Blood monocytes/100 leukocytes              6 %                 0-12        

 

                    Automated blood eosinophils/100 leukocytes              0 %                 0-10       

 

 

                    Automated blood basophils/100 leukocytes              0 %                 0-10        

 

                          Blood neutrophils automated count (number/volume)              11.3 10*3          

                                        1.8-7.8        

 

                          Blood lymphocytes automated count (number/volume)              1.1 10*3           

                                        1.0-4.0        

 

                    Blood monocytes automated count (number/volume)              0.8 10*3              0.0-

1.0        

 

                    Automated eosinophil count              0.0 10*3/uL              0.0-0.3        

 

                    Automated blood basophil count (count/volume)              0.0 10*3/uL              

0.0-0.1        









                                        Comprehensive metabolic panel - 01/10/19 10:00         









                          Serum or plasma sodium measurement (moles/volume)              142 mmol/L         

                                        135-145        

 

                          Serum or plasma potassium measurement (moles/volume)              4.0 mmol/L      

                                        3.6-5.0        

 

                          Serum or plasma chloride measurement (moles/volume)              106 mmol/L       

                                                

 

                    Carbon dioxide              24 mmol/L              21-32        

 

                          Serum or plasma anion gap determination (moles/volume)              12 mmol/L     

                                        5-14        

 

                          Serum or plasma urea nitrogen measurement (mass/volume)              17 mg/dL     

                                        7-18        

 

                          Serum or plasma creatinine measurement (mass/volume)              0.72 mg/dL      

                                        0.60-1.30        

 

                    Serum or plasma urea nitrogen/creatinine mass ratio              24                  NRG

        

 

                                        Serum or plasma creatinine measurement with calculation of estimated glomerular 

filtration rate              >                         NRG        

 

                          Serum or plasma glucose measurement (mass/volume)              145 mg/dL          

                                                

 

                          Serum or plasma calcium measurement (mass/volume)              8.9 mg/dL          

                                        8.5-10.1        

 

                          Serum or plasma total bilirubin measurement (mass/volume)              0.3 mg/dL  

                                        0.1-1.0        

 

                                        Serum or plasma alkaline phosphatase measurement (enzymatic activity/volume)    

                          84 U/L                            

 

                                        Serum or plasma aspartate aminotransferase measurement (enzymatic activity/volume)

                          14 U/L                    5-34        

 

                                        Serum or plasma alanine aminotransferase measurement (enzymatic activity/volume)

                          29 U/L                    0-55        

 

                          Serum or plasma protein measurement (mass/volume)              6.1 g/dL           

                                        6.4-8.2        

 

                          Serum or plasma albumin measurement (mass/volume)              3.4 g/dL           

                                        3.2-4.5        

 

                    CALCIUM CORRECTED              9.4 mg/dL              8.5-10.1        









                                        Complete blood count (CBC) with automated white blood cell (WBC) differential - 

19 06:04         









                          Blood leukocytes automated count (number/volume)              11.3 10*3/uL        

                                        4.3-11.0        

 

                          Blood erythrocytes automated count (number/volume)              4.33 10*6/uL      

                                        4.35-5.85        

 

                          Venous blood hemoglobin measurement (mass/volume)              12.1 g/dL          

                                        11.5-16.0        

 

                    Blood hematocrit (volume fraction)              37 %                35-52        

 

                    Automated erythrocyte mean corpuscular volume              86 [foz_us]              

80-99        

 

                                        Automated erythrocyte mean corpuscular hemoglobin (mass per erythrocyte)        

                          28 pg                     25-34        

 

                                        Automated erythrocyte mean corpuscular hemoglobin concentration measurement (mass/volume)

                          33 g/dL                   32-36        

 

                    Automated erythrocyte distribution width ratio              13.2 %              10.0-

14.5        

 

                    Automated blood platelet count (count/volume)              335 10*3/uL              

130-400        

 

                          Automated blood platelet mean volume measurement              11.5 [foz_us]       

                                        7.4-10.4        

 

                    Automated blood neutrophils/100 leukocytes              91 %                42-75     

   

 

                    Automated blood lymphocytes/100 leukocytes              6 %                 12-44      

  

 

                    Blood monocytes/100 leukocytes              3 %                 0-12        

 

                    Automated blood eosinophils/100 leukocytes              0 %                 0-10       

 

 

                    Automated blood basophils/100 leukocytes              1 %                 0-10        

 

                          Blood neutrophils automated count (number/volume)              10.3 10*3          

                                        1.8-7.8        

 

                          Blood lymphocytes automated count (number/volume)              0.7 10*3           

                                        1.0-4.0        

 

                    Blood monocytes automated count (number/volume)              0.3 10*3              0.0-

1.0        

 

                    Automated eosinophil count              0.0 10*3/uL              0.0-0.3        

 

                    Automated blood basophil count (count/volume)              0.1 10*3/uL              

0.0-0.1        









                                        Comprehensive metabolic panel - 19 06:04         









                          Serum or plasma sodium measurement (moles/volume)              143 mmol/L         

                                        135-145        

 

                          Serum or plasma potassium measurement (moles/volume)              4.1 mmol/L      

                                        3.6-5.0        

 

                          Serum or plasma chloride measurement (moles/volume)              103 mmol/L       

                                                

 

                    Carbon dioxide              26 mmol/L              21-32        

 

                          Serum or plasma anion gap determination (moles/volume)              14 mmol/L     

                                        5-14        

 

                          Serum or plasma urea nitrogen measurement (mass/volume)              19 mg/dL     

                                        7-18        

 

                          Serum or plasma creatinine measurement (mass/volume)              0.72 mg/dL      

                                        0.60-1.30        

 

                    Serum or plasma urea nitrogen/creatinine mass ratio              26                  NRG

        

 

                                        Serum or plasma creatinine measurement with calculation of estimated glomerular 

filtration rate              >                         NRG        

 

                          Serum or plasma glucose measurement (mass/volume)              152 mg/dL          

                                                

 

                          Serum or plasma calcium measurement (mass/volume)              9.7 mg/dL          

                                        8.5-10.1        

 

                          Serum or plasma total bilirubin measurement (mass/volume)              0.4 mg/dL  

                                        0.1-1.0        

 

                                        Serum or plasma alkaline phosphatase measurement (enzymatic activity/volume)    

                          106 U/L                           

 

                                        Serum or plasma aspartate aminotransferase measurement (enzymatic activity/volume)

                          12 U/L                    5-34        

 

                                        Serum or plasma alanine aminotransferase measurement (enzymatic activity/volume)

                          28 U/L                    0-55        

 

                          Serum or plasma protein measurement (mass/volume)              6.9 g/dL           

                                        6.4-8.2        

 

                          Serum or plasma albumin measurement (mass/volume)              3.8 g/dL           

                                        3.2-4.5        

 

                    CALCIUM CORRECTED              9.9 mg/dL              8.5-10.1        









                                        Blood CBC with ordered manual differential panel - 19 13:45         









                          Blood leukocytes automated count (number/volume)              9.1 10*3/uL         

                                        4.3-11.0        

 

                          Blood erythrocytes automated count (number/volume)              4.27 10*6/uL      

                                        4.35-5.85        

 

                          Venous blood hemoglobin measurement (mass/volume)              12.0 g/dL          

                                        11.5-16.0        

 

                    Blood hematocrit (volume fraction)              38 %                35-52        

 

                    Automated erythrocyte mean corpuscular volume              90 [foz_us]              

80-99        

 

                                        Automated erythrocyte mean corpuscular hemoglobin (mass per erythrocyte)        

                          28 pg                     25-34        

 

                                        Automated erythrocyte mean corpuscular hemoglobin concentration measurement (mass/volume)

                          31 g/dL                   32-36        

 

                    Automated erythrocyte distribution width ratio              13.7 %              10.0-

14.5        

 

                    Automated blood platelet count (count/volume)              413 10*3/uL              

130-400        

 

                          Automated blood platelet mean volume measurement              11.9 [foz_us]       

                                        7.4-10.4        

 

                    Automated blood neutrophils/100 leukocytes              71 %                42-75     

   

 

                    Automated blood lymphocytes/100 leukocytes              19 %                12-44     

   

 

                    Blood monocytes/100 leukocytes              2 %                 NRG        

 

                    Automated blood eosinophils/100 leukocytes              2 %                 0-10       

 

 

                    Automated blood basophils/100 leukocytes              0 %                 0-10        

 

                          Blood neutrophils automated count (number/volume)              6.4 10*3           

                                        1.8-7.8        

 

                          Blood lymphocytes automated count (number/volume)              1.7 10*3           

                                        1.0-4.0        

 

                    Blood monocytes automated count (number/volume)              0.8 10*3              0.0-

1.0        

 

                    Automated eosinophil count              0.2 10*3/uL              0.0-0.3        

 

                    Automated blood basophil count (count/volume)              0.0 10*3/uL              

0.0-0.1        

 

                    Manual blood segmented neutrophils/100 leukocytes              74 %                NRG

        

 

                    Blood band neutrophils/100 leukocytes              0 %                 NRG        

 

                    Manual blood lymphocytes/100 leukocytes              19 %                NRG        

 

                    Manual eosinophils/100 leukocytes in nose              3 %                 NRG        

 

                    Manual blood basophils/100 leukocytes              1 %                 NRG        

 

                    Blood lymphocytes variant/100 leukocytes              1 %                 NRG        

 

                          Blood erythrocyte morphology finding identification              NORMAL           

                                        NRG        

 

                    Blood toxic granules detection by light microscopy              1+                  NRG

        









                                        Automated reticulocyte percentage - 19 13:45         









                    Blood reticulocytes count (number/volume)              47 10*9/L              24-90 

       

 

                    Blood reticulocytes/100 erythrocytes              1.10 %              0.50-2.40     

   









                                        [id number] - 19 08:48         









                          Serum or plasma urea nitrogen measurement (mass/volume)              16 mg/dL     

                                        7-18        

 

                          Serum or plasma creatinine measurement (mass/volume)              0.86 mg/dL      

                                        0.60-1.30        

 

                    Serum or plasma urea nitrogen/creatinine mass ratio              19                  NRG

        

 

                                        Serum or plasma creatinine measurement with calculation of estimated glomerular 

filtration rate              >                         NRG        



                                              



Encounters

      





                ACCT No.              Visit Date/Time              Discharge              Status      

                Pt. Type              Provider              Facility              Loc./Unit      

                                        Complaint        

 

                    [id number]              2019 05:38:00              2019 12:27:00      

                DIS              Outpatient              KWAME PINEDA DO              [facility]              PREOP                     BRONCHOSCOPY        

 

                    [id number]              2019 08:38:00              2019 23:59:59      

                CLS              Outpatient              LEIDA LENNON              [facility]

 [facility]              RAD                       ASTHMA,PNEUMONIA        

 

                    [id number]              2019 09:20:00              2019 23:59:59      

                CLS              Preadmit              DERRICK LENNONMARCIA PAREDES APRN              [facility] 

[facility]              RAD                       ASTHMA, DYSPNEA, PNEUMONIA

        

 

                    [id number]              2019 10:57:00              2019 23:59:59      

                CLS              Outpatient              CITLALLILEIDA SCALES APRN              [facility]

 [facility]              LAB                       ASTHMA,DYSPNEA        

 

                    [id number]              2019 11:27:00              2019 23:59:59      

                CLS              Outpatient              LEIDA LENNON APRN              [facility]

 [facility]              RT                        ASTHMA, DYSPNEA        

 

                    [id number]              2019 12:00:00              2019 23:59:59      

                CLS              Preadmit              CITLALLI, LEIDA PAREDES APRN              [facility] 

[facility]              RAD                       ASTHMA, DYSPNEA        

 

                    [id number]              2019 15:07:00              2019 23:59:59      

                CLS              Outpatient              MONICA GARCIA MD              [facility]              LABNPT                             

 

                    [id number]              2019 21:50:00              2019 11:05:00      

                DIS              Inpatient              VIRGIL RANDALL MD              [facility]              4TH                       PNEUMONIA,SEPSIS        

 

                    [id number]              2016 10:54:00              2016 14:15:00      

                DIS              Outpatient              ROSALES COOK MD              [facility]              SDC                       HISTORY RECTAL CANCER        

 

                    [id number]              2016 05:39:00              2016 15:09:00      

                DIS              Outpatient              ROSALES COOK MD              [facility]              PREOP                     HISTORY RECTAL CANCER        

 

                    [id number]              2015 10:46:00              2015 23:59:59      

                CLS              Outpatient              MONICA GARCIA MD              [facility]              RAD                       SCREENING        

 

                    [id number]              2015 14:12:00              2015 23:59:59      

                CLS              Preadmit              PIPPA FISHER MD              [facility]              ONC                                

 

                    [id number]              2015 09:19:00              2015 00:01:00      

                DIS              Outpatient              PIPPA FISHER MD              [facility]              ONC                                

 

                    [id number]              2015 08:13:00              2015 23:59:59      

                CLS              Outpatient              GABBY BAIG DO              [facility]

 [facility]              RAD                       COLON CANCER        

 

                    [id number]              2015 08:00:00              2015 23:59:59      

                CLS              Outpatient              GABBY BAIG DO              [facility]

 [facility]              RAD                       COLON CA        

 

                    [id number]              2013 14:54:00              2013 23:59:59      

                CLS              Outpatient              MONICA GARCIA MD              [facility]              RAD                       SCREENING        

 

                [id number]              07/10/2019 07:30:00                              PEN         

                    Preadmit              KWAME PINEDA DO              [facility]

                          ENDO                      PNEUMONIA/DYPSNEA/ASTHMA        

 

                [id number]              2011 08:29:00                                          

             Document Registration                                                                   

 

 

                [id number]              2010 13:22:00                                          

             Document Registration

## 2019-07-11 NOTE — PULMONARY PROCEDURES
Pulmonary Procedures


Date of Procedure


Date of Service:  Jul 11, 2019


Bronch


Bronchoscopy with bilateral washes. 





Preop DX persistent cough and PNA 





Postop DX: same 





Complications: Pt was desaturating during procedure. Procedure was stopped 

early. 





After informed consent obtained and formal time out pt was sedated using 

Fentanyl and Versed. Bronchoscope was advanced through the nare and vocal cords.

 1% lidocaine was used to anesthetize vocal cords, epiglottis, clovis, and 

left/right main stem bronchus.  An anatomical tour was undertaken down to the 

segmental bronchi bilaterally. No endobronchial lesions noted. Bronchoscopy with

bilateral washes were obtained. Stat CXR is pending.











KWAME PINEDA DO              Jul 11, 2019 08:14

## 2019-12-18 ENCOUNTER — HOSPITAL ENCOUNTER (OUTPATIENT)
Dept: HOSPITAL 75 - RT | Age: 59
End: 2019-12-18
Attending: INTERNAL MEDICINE
Payer: COMMERCIAL

## 2019-12-18 DIAGNOSIS — J45.909: ICD-10-CM

## 2019-12-18 DIAGNOSIS — Z02.71: Primary | ICD-10-CM

## 2019-12-18 PROCEDURE — 94060 EVALUATION OF WHEEZING: CPT

## 2019-12-18 PROCEDURE — 94640 AIRWAY INHALATION TREATMENT: CPT

## 2020-01-15 ENCOUNTER — HOSPITAL ENCOUNTER (OUTPATIENT)
Dept: HOSPITAL 75 - RAD | Age: 60
End: 2020-01-15
Attending: DERMATOLOGY
Payer: COMMERCIAL

## 2020-01-15 DIAGNOSIS — M51.36: ICD-10-CM

## 2020-01-15 DIAGNOSIS — Z02.71: Primary | ICD-10-CM

## 2020-01-15 DIAGNOSIS — M43.16: ICD-10-CM

## 2020-01-15 DIAGNOSIS — M47.816: ICD-10-CM

## 2020-01-15 PROCEDURE — 72100 X-RAY EXAM L-S SPINE 2/3 VWS: CPT

## 2020-01-15 NOTE — DIAGNOSTIC IMAGING REPORT
INDICATION: Back pain.



EXAMINATION: AP and lateral views of the lumbar spine were

obtained.



FINDINGS: Lumbar vertebrae are normal in height with no

compression deformity. There is disc space narrowing at L4-L5 and

L3-L4 as well as L1-L2. There is mild retrolisthesis of L2 on L3

by about 4 mm. There is diffuse facet degenerative change in the

lumbar spine.



IMPRESSION: Degenerative findings in the lumbar spine, as

described above, with multilevel disc space narrowing. There is

mild retrolisthesis of L2 on L3. There is no acute finding. 



Dictated by: 



  Dictated on workstation # [id number]

## 2021-05-19 ENCOUNTER — HOSPITAL ENCOUNTER (OUTPATIENT)
Dept: HOSPITAL 75 - PREOP | Age: 61
LOS: 1 days | Discharge: HOME | End: 2021-05-20
Attending: SPECIALIST
Payer: COMMERCIAL

## 2021-05-19 VITALS — HEIGHT: 64.02 IN | BODY MASS INDEX: 23.94 KG/M2 | WEIGHT: 140.21 LBS

## 2021-05-19 DIAGNOSIS — Z01.818: Primary | ICD-10-CM

## 2021-05-21 ENCOUNTER — HOSPITAL ENCOUNTER (OUTPATIENT)
Dept: HOSPITAL 75 - SDC | Age: 61
Discharge: HOME | End: 2021-05-21
Attending: SPECIALIST
Payer: MEDICARE

## 2021-05-21 VITALS — SYSTOLIC BLOOD PRESSURE: 148 MMHG | DIASTOLIC BLOOD PRESSURE: 86 MMHG

## 2021-05-21 VITALS — HEIGHT: 64.02 IN | BODY MASS INDEX: 23.94 KG/M2 | WEIGHT: 140.21 LBS

## 2021-05-21 VITALS — DIASTOLIC BLOOD PRESSURE: 112 MMHG | SYSTOLIC BLOOD PRESSURE: 176 MMHG

## 2021-05-21 DIAGNOSIS — Z79.899: ICD-10-CM

## 2021-05-21 DIAGNOSIS — Z90.49: ICD-10-CM

## 2021-05-21 DIAGNOSIS — Z88.5: ICD-10-CM

## 2021-05-21 DIAGNOSIS — H25.12: Primary | ICD-10-CM

## 2021-05-21 DIAGNOSIS — Z88.1: ICD-10-CM

## 2021-05-21 DIAGNOSIS — K21.9: ICD-10-CM

## 2021-05-21 PROCEDURE — 66984 XCAPSL CTRC RMVL W/O ECP: CPT

## 2021-05-21 RX ADMIN — TETRACAINE HYDROCHLORIDE PRN ML: 5 SOLUTION OPHTHALMIC at 07:18

## 2021-05-21 RX ADMIN — PHENYLEPHRINE HYDROCHLORIDE SCH ML: 100 SOLUTION/ DROPS OPHTHALMIC at 07:28

## 2021-05-21 RX ADMIN — TROPICAMIDE SCH ML: 10 SOLUTION/ DROPS OPHTHALMIC at 07:18

## 2021-05-21 RX ADMIN — PHENYLEPHRINE HYDROCHLORIDE SCH ML: 100 SOLUTION/ DROPS OPHTHALMIC at 07:18

## 2021-05-21 RX ADMIN — TROPICAMIDE SCH ML: 10 SOLUTION/ DROPS OPHTHALMIC at 07:28

## 2021-05-21 RX ADMIN — TROPICAMIDE SCH ML: 10 SOLUTION/ DROPS OPHTHALMIC at 07:23

## 2021-05-21 RX ADMIN — PHENYLEPHRINE HYDROCHLORIDE SCH ML: 100 SOLUTION/ DROPS OPHTHALMIC at 07:23

## 2021-05-21 RX ADMIN — TETRACAINE HYDROCHLORIDE PRN ML: 5 SOLUTION OPHTHALMIC at 07:09

## 2021-05-21 RX ADMIN — TETRACAINE HYDROCHLORIDE PRN ML: 5 SOLUTION OPHTHALMIC at 07:23

## 2021-05-21 RX ADMIN — TETRACAINE HYDROCHLORIDE PRN ML: 5 SOLUTION OPHTHALMIC at 07:28

## 2021-05-21 NOTE — OPHTHALMOLOGIST PRE-OP NOTE
Pre-Operative Progress Note


H&P Reviewed


The H&P was reviewed, patient examined and no changes noted.


Date H&P Reviewed:  May 21, 2021


Time H&P Reviewed:  08:08


Pre-Op Dx


Cataract, Left Eye











PANFILO AMAYA MD             May 21, 2021 08:08

## 2021-05-21 NOTE — OPHTHALMOLOGY OPERATIVE REPORT
Cataract removal/placement IOL


PREOPERATIVE DIAGNOSIS:    Cataract Left Eye


POSTOPERATIVE DIAGNOSIS: Cataract Left Eye





PROCEDURE: Cataract removal and placement of posterior chamber implant, left eye





SURGEON: Scott Amaya 





ANESTHESIA: Topical with sedation





COMPLICATIONS: None





ESTIMATED BLOOD LOSS: Minimal 





DESCRIPTION OF PROCEDURE:


After proper informed consent was obtained, the patient, a 61 female, was taken 

to the Operating Room and the left eye was anesthetized with tetracaine.  The 

left eye was then prepped and draped in the usual manner.  A wire lid speculum 

was placed. A paracentesis was made at the left hand position. Preservative free

lidocaine was injected into the anterior chamber followed by viscoelastic.  A 

clear corneal incision was made in the temporal position. A capsulorrhexis was 

preformed and the central nuclear and cortical material were removed.  The 

posterior capsule was polished and an Primitivo 16.5 AU00T0 was placed into the 

capsular bag. The residual viscoelastic was aspirated and balanced saline 

solution was injected into the anterior chamber.  Moxifloxacin was injected into

the anterior chamber.





The wound was checked and found to be water tight.





The patient tolerated the procedure well without complications.











SCOTT AMAYA MD             May 21, 2021 08:35

## 2021-05-23 NOTE — ANESTHESIA-GENERAL POST-OP
MAC


Significant Intra-Op Events


Notes


post date entry from 5/21/21 at 1315





Patient Condition


Mental Status/LOC:  Same as Preop


Cardiovascular:  Satisfactory


Nausea/Vomiting:  Absent


Respiratory:  Satisfactory


Pain:  Controlled


Complications:  Absent





Post Op Complications


Complications


None





Follow Up Care/Instructions


Patient Instructions


None needed.





Anesthesiology Discharge Order


Discharge Order


Patient is doing well, no complaints, stable vital signs, no apparent adverse 

anesthesia problems.   


No complications reported per nursing.











CASSIDY AREVALO CRNA         May 23, 2021 12:41

## 2021-05-28 ENCOUNTER — HOSPITAL ENCOUNTER (OUTPATIENT)
Dept: HOSPITAL 75 - PREOP | Age: 61
LOS: 4 days | Discharge: HOME | End: 2021-06-01
Attending: SPECIALIST
Payer: MEDICARE

## 2021-05-28 DIAGNOSIS — Z01.818: Primary | ICD-10-CM

## 2021-06-04 ENCOUNTER — HOSPITAL ENCOUNTER (OUTPATIENT)
Dept: HOSPITAL 75 - SDC | Age: 61
Discharge: HOME | End: 2021-06-04
Attending: SPECIALIST
Payer: MEDICARE

## 2021-06-04 VITALS — SYSTOLIC BLOOD PRESSURE: 170 MMHG | DIASTOLIC BLOOD PRESSURE: 101 MMHG

## 2021-06-04 VITALS — HEIGHT: 64.02 IN | WEIGHT: 140.21 LBS | BODY MASS INDEX: 23.94 KG/M2

## 2021-06-04 VITALS — SYSTOLIC BLOOD PRESSURE: 160 MMHG | DIASTOLIC BLOOD PRESSURE: 86 MMHG

## 2021-06-04 DIAGNOSIS — K21.9: ICD-10-CM

## 2021-06-04 DIAGNOSIS — H25.11: Primary | ICD-10-CM

## 2021-06-04 DIAGNOSIS — Z80.8: ICD-10-CM

## 2021-06-04 DIAGNOSIS — Z79.899: ICD-10-CM

## 2021-06-04 DIAGNOSIS — Z80.0: ICD-10-CM

## 2021-06-04 DIAGNOSIS — E78.5: ICD-10-CM

## 2021-06-04 DIAGNOSIS — J45.909: ICD-10-CM

## 2021-06-04 PROCEDURE — 66984 XCAPSL CTRC RMVL W/O ECP: CPT

## 2021-06-04 RX ADMIN — TROPICAMIDE SCH ML: 10 SOLUTION/ DROPS OPHTHALMIC at 06:28

## 2021-06-04 RX ADMIN — TETRACAINE HYDROCHLORIDE PRN ML: 5 SOLUTION OPHTHALMIC at 06:38

## 2021-06-04 RX ADMIN — PHENYLEPHRINE HYDROCHLORIDE SCH ML: 100 SOLUTION/ DROPS OPHTHALMIC at 06:28

## 2021-06-04 RX ADMIN — TROPICAMIDE SCH ML: 10 SOLUTION/ DROPS OPHTHALMIC at 06:38

## 2021-06-04 RX ADMIN — TETRACAINE HYDROCHLORIDE PRN ML: 5 SOLUTION OPHTHALMIC at 06:33

## 2021-06-04 RX ADMIN — TETRACAINE HYDROCHLORIDE PRN ML: 5 SOLUTION OPHTHALMIC at 06:21

## 2021-06-04 RX ADMIN — TETRACAINE HYDROCHLORIDE PRN ML: 5 SOLUTION OPHTHALMIC at 06:28

## 2021-06-04 RX ADMIN — PHENYLEPHRINE HYDROCHLORIDE SCH ML: 100 SOLUTION/ DROPS OPHTHALMIC at 06:33

## 2021-06-04 RX ADMIN — PHENYLEPHRINE HYDROCHLORIDE SCH ML: 100 SOLUTION/ DROPS OPHTHALMIC at 06:38

## 2021-06-04 RX ADMIN — TROPICAMIDE SCH ML: 10 SOLUTION/ DROPS OPHTHALMIC at 06:33

## 2021-06-04 NOTE — ANESTHESIA-GENERAL POST-OP
MAC


Patient Condition


Mental Status/LOC:  Same as Preop


Cardiovascular:  Satisfactory


Nausea/Vomiting:  Absent


Respiratory:  Satisfactory


Pain:  Controlled


Complications:  Absent





Post Op Complications


Complications


None





Follow Up Care/Instructions


Patient Instructions


None needed.





Anesthesiology Discharge Order


Discharge Order


Patient is doing well, no complaints, stable vital signs, no apparent adverse 

anesthesia problems.   


No complications reported per nursing.











KELSEA COLUNGA CRNA             Jun 4, 2021 14:02

## 2021-06-04 NOTE — OPHTHALMOLOGIST PRE-OP NOTE
Pre-Operative Progress Note


H&P Reviewed


The H&P was reviewed, patient examined and no changes noted.


Date H&P Reviewed:  Jun 4, 2021


Time H&P Reviewed:  07:11


Pre-Op Dx


Cataract, Right Eye











PANFILO AMAYA MD              Jun 4, 2021 07:31

## 2021-06-04 NOTE — OPHTHALMOLOGY OPERATIVE REPORT
Cataract removal/placement IOL


PREOPERATIVE DIAGNOSIS: Cataract Right Eye


POSTOPERATIVE DIAGNOSIS: Cataract Right Eye





PROCEDURE: Cataract removal and placement of posterior chamber implant, right 

eye





SURGEON: Scott Amaya 





ANESTHESIA: Topical with sedation





COMPLICATIONS: None





ESTIMATED BLOOD LOSS: Minimal 





DESCRIPTION OF PROCEDURE:


After proper informed consent was obtained, the patient, a 61 female, was taken 

to the Operating Room and the right eye was anesthetized with tetracaine.  The 

right eye was then prepped and draped in the usual manner.  A wire lid speculum 

was placed. A paracentesis was made at the left hand position. Preservative free

lidocaine was injected into the anterior chamber followed by viscoelastic.  A 

clear corneal incision was made in the temporal position. A capsulorrhexis was 

preformed and the central nuclear and cortical material were removed.  The 

posterior capsule was polished and Primitivo 17.0 AU00T0  IOL was placed into the 

capsular bag. The residual viscoelastic was aspirated and balanced saline 

solution was injected into the anterior chamber. Moxifloxacin  was injected into

the anterior chamber.





The wound was checked and found to be water tight.





The patient tolerated the procedure well without complications.











SCOTT AMAYA MD              Jun 4, 2021 07:31

## 2023-09-18 NOTE — PULMONARY CONSULTATION
MCPIPPA QUINTEROS STUDENT 1/10/19 1146:


History of Present Illness


History of Present Illness


Date of Consultation


1/10/19


 11:40


Time Seen by Provider:  12:00


Date of Admission


1/7/19


Reason for Visit:  Pneumonia and sepsis


History of Present Illness


Patient presented to ED on Monday 1/7/19 for sharp right sided chest pain that 

began the night prior. She had a fever of 101.6 and a WBC count of 22,000. She 

informed them that she had been ill with "flu" like symptoms over Thanksgiving, 

which resolved but resumed again at [place] time. She worked on Saturday (1/5

) and Sunday (1/6) but did not feel well either day. Sunday night, she 

experienced a sharp right sided chest pain. She was treated here for pneumonia 

with ceftriaxone and azithromycin. She has been using her Breo inhaler and 

doing duonebs Q4 hours. She has a history of severe asthma and allergies but 

has not had a PFT in a long time. Today, she is feeling a lot better. She still 

has some fatigue and some weakness. She is complaining of sores in her nose and 

on her lip; she gets cold sore breakouts occasionally with fevers.





Allergies and Home Medications


Allergies


Coded Allergies:  


     amoxicillin (Verified  Allergy, Intermediate, LIPS SWELL, 11/30/16)


     clavulanic acid (Verified  Allergy, Intermediate, LIPS SWELL, 11/30/16)


     codeine (Verified  Allergy, Intermediate, "KNOCKED HER OUT", 11/30/16)





Home Medications


Albuterol Sulfate 1 Puff Puff, 2 PUFF INH Q4H PRN for SHORTNESS OF BREATH, (

Reported)


Albuterol Sulfate 1.25 Mg/3 Ml Vial.neb, 3 ML NEB Q4H PRN for SHORTNESS OF 

BREATH, (Reported)


Calcium Carbonate/Vitamin D3 1 Each Tablet, 1 TAB PO BID, (Reported)


Estrogens Conjugated 30 Gm Cr, VG MoTh, (Reported)


Fluticasone Propionate 9.9 Ml [place].susp, 2 SPRAY NS DAILY PRN for ALLERGIES, (

Reported)


Fluticasone/Vilanterol 1 Each Blst.w.dev, 1 PUFF INH DAILY, (Reported)


Loratadine 10 Mg Tablet, 10 MG PO DAILY PRN for ALLERGIES, (Reported)


Multivitamin 1 Each Tablet, 1 TAB PO DAILY, (Reported)


Ranitidine HCl 150 Mg Tablet, 150 MG PO DAILY PRN for HEARTBURN, (Reported)


Sertraline HCl 100 Mg Tablet, 100 MG PO HS, (Reported)


Simvastatin 20 Mg Tablet, 20 MG PO HS, (Reported)





Past Medical-Social-Family Hx


Past Med/Social Hx:  Reviewed Nursing Past Med/Soc Hx


Patient Social History


Alcohol Use:  Denies Use


Recreational Drug Use:  No


Smoking Status:  Never a Smoker


2nd Hand Smoke Exposure:  No


Recent Foreign Travel:  No


Contact w/Someone Who Travel:  No


Recent Infectious Disease Expo:  No


Recent Hopitalizations:  No


Physical Abuse:  No


Sexual Abuse:  No


Mistreated:  No


Fear:  No





Immunizations Up To Date


PED Vaccines UTD:  Yes





Seasonal Allergies


Seasonal Allergies:  Yes





Past Medical History


Surgeries:  Yes (FATTY TUMOR RIGHT ARM)


Respiratory:  Yes


Asthma, COPD


Cardiac:  Yes


High Cholesterol


Neurological:  No


Headaches /Migraines


Pregnant:  No


Reproductive Disorders:  No


Female Reproductive Disorders:  Denies


GYN History:  Menopausal


Sexually Transmitted Disease:  No


HIV/AIDS:  No


Genitourinary:  No


Gastrointestinal:  Yes


Diverticulosis, Chronic Diarrhea, Polyps


Musculoskeletal:  No


Arthritis


Endocrine:  No


HEENT:  No


Loss of Vision:  Denies


Hearing Impairment:  Denies


Cancer:  Yes (CANCEROUS POLYP REMOVED)


Colon


Psychosocial:  No (HX DEPRESSION)


Integumentary:  No


Blood Disorders:  No


Adverse Reaction/Blood Tranf:  No (N/A)





Family Medical History





Cardiovascular disease


  19 FATHER


Diabetes mellitus


  19 MOTHER


Myocardial infarction


  19 MOTHER





Review of Systems


Constitutional:  Weakness; No: Fever, Chills, Sweats


Eyes:  No: Pain, Vision change


ENT:  Nose pain (sores in her left nare), Nose discharge, Mouth pain (lip sore)

; No: Throat pain, Throat swelling


Respiratory:  Cough, Shortness of breath, Pleuritic Pain; No: Wheezing, 

Hemoptysis


Cardiovascular:  No: Chest Pain, Palpitations, Orthopnea, Paroxysmal Noc. 

Dyspnea, Edema


Gastrointestinal:  No: Abdominal Pain, Diarrhea, Constipation


Genitourinary:  No Dysuria, No Frequency


Skin:  Lesions (on left nare and on lower lip); No: Rash


Neurological:  Weakness





Sepsis Event


Evaluation


Height, Weight, BMI


Height: 5'4.00"


Weight: 112lbs. 0.0oz. [id number]; 19.2 BMI


Method:Stated





Exam


Exam





Vital Signs








  Date Time  Temp Pulse Resp B/P (MAP) Pulse Ox O2 Delivery O2 Flow Rate FiO2


 


1/10/19 08:32 98.7 86 18 125/68 (87) 96 Room Air  


 


1/10/19 08:04     96 Room Air  


 


1/10/19 04:10 97.9 96 20 144/76 (98) 94 Room Air  


 


1/10/19 02:27     98 Room Air  


 


1/10/19 00:06 98.9 91 18 125/69 (87) 96 Room Air  


 


1/9/19 22:54     96 Room Air  


 


1/9/19 20:30     97 Room Air  


 


1/9/19 20:15 99.1 93 18 122/58 (79) 96 Room Air  


 


1/9/19 18:56     93 Room Air  


 


1/9/19 16:40 98.8 76 18 137/68 (91) 95 Room Air  


 


1/9/19 14:42     94 Room Air  


 


1/9/19 12:00 98.1 102 16 125/64 (84) 95 Room Air  














I & O 


 


 1/10/19





 07:00


 


Intake Total 2200 ml


 


Output Total 2300 ml


 


Balance -100 ml








Height & Weight


Height: 5'4.00"


Weight: 112lbs. 0.0oz. 50.113923ig; 19.2 BMI


Method:Stated


General Appearance:  No Apparent Distress, WD/WN, Thin


HEENT:  Pharynx Normal, Moist Mucous Membranes, Other (vesicular lesion on 

lower lip and multiple within left nare)


Neck:  Full Range of Motion, Normal Inspection, Non Tender, Supple


Respiratory:  Chest Non Tender, Normal Breath Sounds, No Accessory Muscle Use, 

No Respiratory Distress, Decreased Breath Sounds (right upper lobe)


Cardiovascular:  Regular Rate, Rhythm, No Edema, No Gallop, No JVD, No Murmur, 

Normal Peripheral Pulses


Capillary Refill:  Less Than 3 Seconds


Gastrointestinal:  normal bowel sounds, non tender, soft


Extremity:  Normal Capillary Refill, Normal Inspection, Non Tender, No Calf 

Tenderness, No Pedal Edema


Neurologic/Psychiatric:  Alert, Oriented x3, No Motor/Sensory Deficits, Normal 

Mood/Affect


Skin:  Normal Color, Warm/Dry


Lymphatic:  No Adenopathy





Results


Lab


Laboratory Tests


1/10/19 10:00











Assessment/Plan


Assessment/Plan


Pneumonia -- rt upper lobe


   - CXR showed Right upper lobe pneumonia 


   - On Azithromycin and ceftriaxone


   - No need for supplemental oxygen


   - WBC count trending down at 13.2 today, down from 18.9 yesterday; no fevers


   - Monitor labs, CXR pending for today


Asthma exacerbation


   - Allergic asthma likely


   - On Breo, duonebs Q4


   - Dr. Aguero added solumederol 40 Q6h today for wheezing


   - In need of PFTs


Severe allergies 


   - Continue home medications 


   - Singulair added 


   - Consider out patient allergy shots for desensitization. 


Herpes simplex virus outbreak in left nare and on lower lip


   -valacyclovir 1000mg PO BID 


Hyperlipidemia, chronic


Depression, chronic





KWAME PINEDA DO 1/10/19 1311:


History of Present Illness


History of Present Illness


Time Seen by Provider:  13:01


History of Present Illness


58yo with hx of asthma, nonsmoker, allergic rhinitis presented 1/07/19 to the  

ED secondary to worsening SOB and wheezing. Symptoms started on Sunday . In the 

ED she was dx with pneumonia. She has had fever/NS/C. No prior episodes like 

this in the past. Pt has been on Rocephin and Azithromycin. She is feeling 

improved with less SOB however she doees have pleuritic CP. I am consulted to 

establish care for pulmonary management.





Allergies and Home Medications


Allergies


Coded Allergies:  


     amoxicillin (Verified  Allergy, Intermediate, LIPS SWELL, 11/30/16)


     clavulanic acid (Verified  Allergy, Intermediate, LIPS SWELL, 11/30/16)


     codeine (Verified  Allergy, Intermediate, "KNOCKED HER OUT", 11/30/16)





Home Medications


Albuterol Sulfate 1 Puff Puff, 2 PUFF INH Q4H PRN for SHORTNESS OF BREATH, (

Reported)


Albuterol Sulfate 1.25 Mg/3 Ml Vial.neb, 3 ML NEB Q4H PRN for SHORTNESS OF 

BREATH, (Reported)


Calcium Carbonate/Vitamin D3 1 Each Tablet, 1 TAB PO BID, (Reported)


Estrogens Conjugated 30 Gm Cr, VG MoTh, (Reported)


Fluticasone Propionate 9.9 Ml [place].susp, 2 SPRAY NS DAILY PRN for ALLERGIES, (

Reported)


Fluticasone/Vilanterol 1 Each Blst.w.dev, 1 PUFF INH DAILY, (Reported)


Loratadine 10 Mg Tablet, 10 MG PO DAILY PRN for ALLERGIES, (Reported)


Multivitamin 1 Each Tablet, 1 TAB PO DAILY, (Reported)


Ranitidine HCl 150 Mg Tablet, 150 MG PO DAILY PRN for HEARTBURN, (Reported)


Sertraline HCl 100 Mg Tablet, 100 MG PO HS, (Reported)


Simvastatin 20 Mg Tablet, 20 MG PO HS, (Reported)





Past Medical-Social-Family Hx


Family Medical History





Cardiovascular disease


  19 FATHER


Diabetes mellitus


  19 MOTHER


Myocardial infarction


  19 MOTHER





Review of Systems


Time Seen by Provider:  07:16





Exam


Exam


General Appearance:  No Apparent Distress


HEENT:  Pharynx Normal, Moist Mucous Membranes


Respiratory:  Chest Non Tender, Normal Breath Sounds, No Accessory Muscle Use, 

No Respiratory Distress, Decreased Breath Sounds (right upper lobe)


Cardiovascular:  Regular Rate, Rhythm, No Edema, No Gallop, No JVD, No Murmur, 

Normal Peripheral Pulses


Gastrointestinal:  normal bowel sounds, non tender, soft


Extremity:  Normal Capillary Refill, Normal Inspection


Neurologic/Psychiatric:  Alert, Oriented x3, No Motor/Sensory Deficits, Normal 

Mood/Affect


Skin:  Normal Color, Warm/Dry


Lymphatic:  No Adenopathy





Assessment/Plan


Assessment/Plan


RUL Pneumonia 


   Azithromycin and ceftriaxone


   Pan cultures pending 


Asthma exacerbation


   Brejose luna Q4


   solumederol 40 Q6h


Severe allergies 


   Continue home medications 


   PIPPA Hutchinson STUDENT Martin 10, 2019 11:46


KWAME PINEDA DO Martin 10, 2019 13:11 Rifampin Pregnancy And Lactation Text: This medication is Pregnancy Category C and it isn't know if it is safe during pregnancy. It is also excreted in breast milk and should not be used if you are breast feeding.

## 2024-02-05 NOTE — DIAGNOSTIC IMAGING REPORT
PROCEDURE: CT chest with contrast only.



TECHNIQUE: Multiple contiguous axial images were obtained through

the chest after administration of intravenous contrast. Auto

Exposure Controls were utilized during the CT exam to meet ALARA

standards for radiation dose reduction. 



INDICATION:  Asthma and pneumonia, followup.



COMPARISON: Correlation is made with prior CT chest from

02/27/2019.



FINDINGS: No axillary lymphadenopathy is detected. No mediastinal

or hilar lymphadenopathy is detected. No pericardial or pleural

fluid is identified. Minimal residual infiltrate or atelectasis

in the right upper lobe is seen. Remainder of the lung fields are

clear apart from some minimal scarring or atelectasis in the

right middle lobe laterally. No masses or nodules are identified.

Upper abdomen is unremarkable.



IMPRESSION: There is some residual infiltrate or atelectasis in

the right upper lobe, similar to prior exam from 02/27/2019. No

parenchymal mass or thoracic lymphadenopathy is detected.



Dictated by: 



  Dictated on workstation # [id number] Detail Level: Zone Ipledge Number (Optional): [id number] Patient Reported Weight (Optional - Include Units): 110